# Patient Record
Sex: FEMALE | Race: WHITE | NOT HISPANIC OR LATINO | Employment: UNEMPLOYED | URBAN - METROPOLITAN AREA
[De-identification: names, ages, dates, MRNs, and addresses within clinical notes are randomized per-mention and may not be internally consistent; named-entity substitution may affect disease eponyms.]

---

## 2023-05-17 LAB
CREATININE (MG/DL) IN SER/PLAS: 1.21 MG/DL (ref 0.5–1.05)
GFR FEMALE: 49 ML/MIN/1.73M2

## 2023-06-04 LAB — URINE CULTURE: NO GROWTH

## 2023-08-30 PROBLEM — N20.0 NEPHROLITHIASIS: Status: ACTIVE | Noted: 2023-08-30

## 2023-08-30 PROBLEM — R33.9 INCOMPLETE BLADDER EMPTYING: Status: ACTIVE | Noted: 2023-08-30

## 2023-08-30 PROBLEM — L82.1 OTHER SEBORRHEIC KERATOSIS: Status: ACTIVE | Noted: 2023-08-10

## 2023-08-30 PROBLEM — N39.3 FEMALE STRESS INCONTINENCE: Status: ACTIVE | Noted: 2023-08-30

## 2023-08-30 PROBLEM — N81.9 FEMALE GENITAL PROLAPSE: Status: ACTIVE | Noted: 2023-08-30

## 2023-08-30 RX ORDER — CETIRIZINE HYDROCHLORIDE 10 MG/1
TABLET ORAL
COMMUNITY

## 2023-08-30 RX ORDER — BUTALBITAL, ACETAMINOPHEN, CAFFEINE AND CODEINE PHOSPHATE 50; 325; 40; 30 MG/1; MG/1; MG/1; MG/1
CAPSULE ORAL
COMMUNITY

## 2023-08-30 RX ORDER — VITAMIN B COMPLEX
CAPSULE ORAL
COMMUNITY

## 2023-08-30 RX ORDER — LEVOTHYROXINE SODIUM 88 UG/1
TABLET ORAL
COMMUNITY

## 2023-08-30 RX ORDER — AZELASTINE 1 MG/ML
SPRAY, METERED NASAL
COMMUNITY
Start: 2022-05-27

## 2023-08-30 RX ORDER — ESCITALOPRAM OXALATE 10 MG/1
TABLET ORAL
COMMUNITY

## 2023-10-06 ENCOUNTER — APPOINTMENT (OUTPATIENT)
Dept: UROLOGY | Facility: CLINIC | Age: 68
End: 2023-10-06
Payer: MEDICARE

## 2024-01-19 ENCOUNTER — APPOINTMENT (OUTPATIENT)
Dept: RADIOLOGY | Facility: CLINIC | Age: 69
End: 2024-01-19
Payer: MEDICARE

## 2024-01-22 ENCOUNTER — LAB (OUTPATIENT)
Dept: LAB | Facility: LAB | Age: 69
End: 2024-01-22
Payer: MEDICARE

## 2024-01-22 DIAGNOSIS — E78.2 MIXED HYPERLIPIDEMIA: ICD-10-CM

## 2024-01-22 DIAGNOSIS — R10.13 EPIGASTRIC PAIN: Primary | ICD-10-CM

## 2024-01-22 LAB
ALBUMIN SERPL BCP-MCNC: 4.2 G/DL (ref 3.4–5)
ALP SERPL-CCNC: 52 U/L (ref 33–136)
ALT SERPL W P-5'-P-CCNC: 11 U/L (ref 7–45)
AMYLASE SERPL-CCNC: 39 U/L (ref 29–103)
ANION GAP SERPL CALC-SCNC: 11 MMOL/L (ref 10–20)
AST SERPL W P-5'-P-CCNC: 12 U/L (ref 9–39)
BASOPHILS # BLD AUTO: 0.03 X10*3/UL (ref 0–0.1)
BASOPHILS NFR BLD AUTO: 0.5 %
BILIRUB SERPL-MCNC: 0.5 MG/DL (ref 0–1.2)
BUN SERPL-MCNC: 19 MG/DL (ref 6–23)
CALCIUM SERPL-MCNC: 9.6 MG/DL (ref 8.6–10.3)
CHLORIDE SERPL-SCNC: 104 MMOL/L (ref 98–107)
CHOLEST SERPL-MCNC: 292 MG/DL (ref 0–199)
CHOLESTEROL/HDL RATIO: 7.2
CO2 SERPL-SCNC: 34 MMOL/L (ref 21–32)
CREAT SERPL-MCNC: 1.14 MG/DL (ref 0.5–1.05)
EGFRCR SERPLBLD CKD-EPI 2021: 53 ML/MIN/1.73M*2
EOSINOPHIL # BLD AUTO: 0.17 X10*3/UL (ref 0–0.7)
EOSINOPHIL NFR BLD AUTO: 2.9 %
ERYTHROCYTE [DISTWIDTH] IN BLOOD BY AUTOMATED COUNT: 13 % (ref 11.5–14.5)
GLUCOSE SERPL-MCNC: 94 MG/DL (ref 74–99)
HCT VFR BLD AUTO: 46 % (ref 36–46)
HDLC SERPL-MCNC: 40.7 MG/DL
HGB BLD-MCNC: 14.5 G/DL (ref 12–16)
IMM GRANULOCYTES # BLD AUTO: 0.01 X10*3/UL (ref 0–0.7)
IMM GRANULOCYTES NFR BLD AUTO: 0.2 % (ref 0–0.9)
LDLC SERPL CALC-MCNC: 206 MG/DL
LIPASE SERPL-CCNC: 35 U/L (ref 9–82)
LYMPHOCYTES # BLD AUTO: 2.37 X10*3/UL (ref 1.2–4.8)
LYMPHOCYTES NFR BLD AUTO: 40.4 %
MCH RBC QN AUTO: 29.7 PG (ref 26–34)
MCHC RBC AUTO-ENTMCNC: 31.5 G/DL (ref 32–36)
MCV RBC AUTO: 94 FL (ref 80–100)
MONOCYTES # BLD AUTO: 0.39 X10*3/UL (ref 0.1–1)
MONOCYTES NFR BLD AUTO: 6.6 %
NEUTROPHILS # BLD AUTO: 2.9 X10*3/UL (ref 1.2–7.7)
NEUTROPHILS NFR BLD AUTO: 49.4 %
NON HDL CHOLESTEROL: 251 MG/DL (ref 0–149)
NRBC BLD-RTO: 0 /100 WBCS (ref 0–0)
PLATELET # BLD AUTO: 365 X10*3/UL (ref 150–450)
POTASSIUM SERPL-SCNC: 4.9 MMOL/L (ref 3.5–5.3)
PROT SERPL-MCNC: 6.4 G/DL (ref 6.4–8.2)
RBC # BLD AUTO: 4.88 X10*6/UL (ref 4–5.2)
SODIUM SERPL-SCNC: 144 MMOL/L (ref 136–145)
TRIGL SERPL-MCNC: 227 MG/DL (ref 0–149)
VLDL: 45 MG/DL (ref 0–40)
WBC # BLD AUTO: 5.9 X10*3/UL (ref 4.4–11.3)

## 2024-01-22 PROCEDURE — 36415 COLL VENOUS BLD VENIPUNCTURE: CPT

## 2024-01-22 PROCEDURE — 85025 COMPLETE CBC W/AUTO DIFF WBC: CPT

## 2024-01-22 PROCEDURE — 80061 LIPID PANEL: CPT

## 2024-01-22 PROCEDURE — 82150 ASSAY OF AMYLASE: CPT

## 2024-01-22 PROCEDURE — 80053 COMPREHEN METABOLIC PANEL: CPT

## 2024-01-22 PROCEDURE — 83690 ASSAY OF LIPASE: CPT

## 2024-01-23 ENCOUNTER — TELEPHONE (OUTPATIENT)
Dept: CARDIOLOGY | Facility: CLINIC | Age: 69
End: 2024-01-23
Payer: MEDICARE

## 2024-01-23 ENCOUNTER — APPOINTMENT (OUTPATIENT)
Dept: RADIOLOGY | Facility: CLINIC | Age: 69
End: 2024-01-23
Payer: MEDICARE

## 2024-01-23 NOTE — TELEPHONE ENCOUNTER
Patient had her lipids checked and they are high. She will like for you to take a lot at the results and advice her.

## 2024-01-26 ENCOUNTER — APPOINTMENT (OUTPATIENT)
Dept: RADIOLOGY | Facility: CLINIC | Age: 69
End: 2024-01-26
Payer: MEDICARE

## 2024-02-21 ENCOUNTER — APPOINTMENT (OUTPATIENT)
Dept: RADIOLOGY | Facility: CLINIC | Age: 69
End: 2024-02-21
Payer: MEDICARE

## 2024-03-07 ENCOUNTER — APPOINTMENT (OUTPATIENT)
Dept: RADIOLOGY | Facility: CLINIC | Age: 69
End: 2024-03-07
Payer: MEDICARE

## 2024-03-09 ENCOUNTER — APPOINTMENT (OUTPATIENT)
Dept: CARDIOLOGY | Facility: HOSPITAL | Age: 69
End: 2024-03-09
Payer: MEDICARE

## 2024-03-09 ENCOUNTER — APPOINTMENT (OUTPATIENT)
Dept: RADIOLOGY | Facility: HOSPITAL | Age: 69
End: 2024-03-09
Payer: MEDICARE

## 2024-03-09 ENCOUNTER — HOSPITAL ENCOUNTER (EMERGENCY)
Facility: HOSPITAL | Age: 69
Discharge: HOME | End: 2024-03-09
Attending: STUDENT IN AN ORGANIZED HEALTH CARE EDUCATION/TRAINING PROGRAM
Payer: MEDICARE

## 2024-03-09 VITALS
SYSTOLIC BLOOD PRESSURE: 146 MMHG | OXYGEN SATURATION: 98 % | HEART RATE: 70 BPM | RESPIRATION RATE: 18 BRPM | BODY MASS INDEX: 26.37 KG/M2 | WEIGHT: 168 LBS | DIASTOLIC BLOOD PRESSURE: 80 MMHG | HEIGHT: 67 IN | TEMPERATURE: 98.1 F

## 2024-03-09 DIAGNOSIS — J44.1 COPD EXACERBATION (MULTI): Primary | ICD-10-CM

## 2024-03-09 DIAGNOSIS — R07.89 OTHER CHEST PAIN: ICD-10-CM

## 2024-03-09 LAB
ALBUMIN SERPL BCP-MCNC: 4.6 G/DL (ref 3.4–5)
ALP SERPL-CCNC: 59 U/L (ref 33–136)
ALT SERPL W P-5'-P-CCNC: 14 U/L (ref 7–45)
ANION GAP SERPL CALC-SCNC: 12 MMOL/L (ref 10–20)
APPEARANCE UR: CLEAR
AST SERPL W P-5'-P-CCNC: 14 U/L (ref 9–39)
BASOPHILS # BLD AUTO: 0.04 X10*3/UL (ref 0–0.1)
BASOPHILS NFR BLD AUTO: 0.5 %
BILIRUB SERPL-MCNC: 0.5 MG/DL (ref 0–1.2)
BILIRUB UR STRIP.AUTO-MCNC: NEGATIVE MG/DL
BNP SERPL-MCNC: 24 PG/ML (ref 0–99)
BUN SERPL-MCNC: 21 MG/DL (ref 6–23)
CALCIUM SERPL-MCNC: 10.3 MG/DL (ref 8.6–10.3)
CARDIAC TROPONIN I PNL SERPL HS: 3 NG/L (ref 0–13)
CARDIAC TROPONIN I PNL SERPL HS: 3 NG/L (ref 0–13)
CHLORIDE SERPL-SCNC: 103 MMOL/L (ref 98–107)
CO2 SERPL-SCNC: 29 MMOL/L (ref 21–32)
COLOR UR: NORMAL
CREAT SERPL-MCNC: 1.02 MG/DL (ref 0.5–1.05)
D DIMER PPP FEU-MCNC: 530 NG/ML FEU
EGFRCR SERPLBLD CKD-EPI 2021: 61 ML/MIN/1.73M*2
EOSINOPHIL # BLD AUTO: 0.2 X10*3/UL (ref 0–0.7)
EOSINOPHIL NFR BLD AUTO: 2.7 %
ERYTHROCYTE [DISTWIDTH] IN BLOOD BY AUTOMATED COUNT: 12.6 % (ref 11.5–14.5)
GLUCOSE SERPL-MCNC: 100 MG/DL (ref 74–99)
GLUCOSE UR STRIP.AUTO-MCNC: NEGATIVE MG/DL
HCT VFR BLD AUTO: 46.7 % (ref 36–46)
HGB BLD-MCNC: 15.1 G/DL (ref 12–16)
IMM GRANULOCYTES # BLD AUTO: 0.02 X10*3/UL (ref 0–0.7)
IMM GRANULOCYTES NFR BLD AUTO: 0.3 % (ref 0–0.9)
INR PPP: 1 (ref 0.9–1.1)
KETONES UR STRIP.AUTO-MCNC: NEGATIVE MG/DL
LEUKOCYTE ESTERASE UR QL STRIP.AUTO: NEGATIVE
LIPASE SERPL-CCNC: 51 U/L (ref 9–82)
LYMPHOCYTES # BLD AUTO: 2.88 X10*3/UL (ref 1.2–4.8)
LYMPHOCYTES NFR BLD AUTO: 39.1 %
MCH RBC QN AUTO: 29.5 PG (ref 26–34)
MCHC RBC AUTO-ENTMCNC: 32.3 G/DL (ref 32–36)
MCV RBC AUTO: 91 FL (ref 80–100)
MONOCYTES # BLD AUTO: 0.51 X10*3/UL (ref 0.1–1)
MONOCYTES NFR BLD AUTO: 6.9 %
NEUTROPHILS # BLD AUTO: 3.72 X10*3/UL (ref 1.2–7.7)
NEUTROPHILS NFR BLD AUTO: 50.5 %
NITRITE UR QL STRIP.AUTO: NEGATIVE
NRBC BLD-RTO: 0 /100 WBCS (ref 0–0)
PH UR STRIP.AUTO: 5 [PH]
PLATELET # BLD AUTO: 376 X10*3/UL (ref 150–450)
POTASSIUM SERPL-SCNC: 4.3 MMOL/L (ref 3.5–5.3)
PROT SERPL-MCNC: 7.3 G/DL (ref 6.4–8.2)
PROT UR STRIP.AUTO-MCNC: NEGATIVE MG/DL
PROTHROMBIN TIME: 11.2 SECONDS (ref 9.8–12.8)
RBC # BLD AUTO: 5.11 X10*6/UL (ref 4–5.2)
RBC # UR STRIP.AUTO: NEGATIVE /UL
SODIUM SERPL-SCNC: 140 MMOL/L (ref 136–145)
SP GR UR STRIP.AUTO: 1.01
UROBILINOGEN UR STRIP.AUTO-MCNC: <2 MG/DL
WBC # BLD AUTO: 7.4 X10*3/UL (ref 4.4–11.3)

## 2024-03-09 PROCEDURE — 83880 ASSAY OF NATRIURETIC PEPTIDE: CPT | Performed by: STUDENT IN AN ORGANIZED HEALTH CARE EDUCATION/TRAINING PROGRAM

## 2024-03-09 PROCEDURE — 2500000004 HC RX 250 GENERAL PHARMACY W/ HCPCS (ALT 636 FOR OP/ED)

## 2024-03-09 PROCEDURE — 85610 PROTHROMBIN TIME: CPT | Performed by: STUDENT IN AN ORGANIZED HEALTH CARE EDUCATION/TRAINING PROGRAM

## 2024-03-09 PROCEDURE — 84484 ASSAY OF TROPONIN QUANT: CPT | Performed by: STUDENT IN AN ORGANIZED HEALTH CARE EDUCATION/TRAINING PROGRAM

## 2024-03-09 PROCEDURE — 96361 HYDRATE IV INFUSION ADD-ON: CPT

## 2024-03-09 PROCEDURE — 71045 X-RAY EXAM CHEST 1 VIEW: CPT | Performed by: RADIOLOGY

## 2024-03-09 PROCEDURE — 80053 COMPREHEN METABOLIC PANEL: CPT | Performed by: STUDENT IN AN ORGANIZED HEALTH CARE EDUCATION/TRAINING PROGRAM

## 2024-03-09 PROCEDURE — 2500000004 HC RX 250 GENERAL PHARMACY W/ HCPCS (ALT 636 FOR OP/ED): Performed by: STUDENT IN AN ORGANIZED HEALTH CARE EDUCATION/TRAINING PROGRAM

## 2024-03-09 PROCEDURE — 71045 X-RAY EXAM CHEST 1 VIEW: CPT

## 2024-03-09 PROCEDURE — 93005 ELECTROCARDIOGRAM TRACING: CPT

## 2024-03-09 PROCEDURE — 85025 COMPLETE CBC W/AUTO DIFF WBC: CPT | Performed by: STUDENT IN AN ORGANIZED HEALTH CARE EDUCATION/TRAINING PROGRAM

## 2024-03-09 PROCEDURE — 36415 COLL VENOUS BLD VENIPUNCTURE: CPT

## 2024-03-09 PROCEDURE — 99283 EMERGENCY DEPT VISIT LOW MDM: CPT | Mod: 25

## 2024-03-09 PROCEDURE — 84484 ASSAY OF TROPONIN QUANT: CPT

## 2024-03-09 PROCEDURE — 96360 HYDRATION IV INFUSION INIT: CPT

## 2024-03-09 PROCEDURE — 85379 FIBRIN DEGRADATION QUANT: CPT

## 2024-03-09 PROCEDURE — 83690 ASSAY OF LIPASE: CPT | Performed by: STUDENT IN AN ORGANIZED HEALTH CARE EDUCATION/TRAINING PROGRAM

## 2024-03-09 PROCEDURE — 36415 COLL VENOUS BLD VENIPUNCTURE: CPT | Performed by: STUDENT IN AN ORGANIZED HEALTH CARE EDUCATION/TRAINING PROGRAM

## 2024-03-09 PROCEDURE — 81003 URINALYSIS AUTO W/O SCOPE: CPT

## 2024-03-09 RX ORDER — BUDESONIDE AND FORMOTEROL FUMARATE DIHYDRATE 80; 4.5 UG/1; UG/1
2 AEROSOL RESPIRATORY (INHALATION)
Qty: 10.2 G | Refills: 0 | Status: SHIPPED | OUTPATIENT
Start: 2024-03-09 | End: 2025-03-09

## 2024-03-09 RX ADMIN — SODIUM CHLORIDE, POTASSIUM CHLORIDE, SODIUM LACTATE AND CALCIUM CHLORIDE 500 ML: 600; 310; 30; 20 INJECTION, SOLUTION INTRAVENOUS at 17:49

## 2024-03-09 RX ADMIN — SODIUM CHLORIDE, POTASSIUM CHLORIDE, SODIUM LACTATE AND CALCIUM CHLORIDE 500 ML: 600; 310; 30; 20 INJECTION, SOLUTION INTRAVENOUS at 18:41

## 2024-03-09 ASSESSMENT — PAIN - FUNCTIONAL ASSESSMENT: PAIN_FUNCTIONAL_ASSESSMENT: 0-10

## 2024-03-09 ASSESSMENT — COLUMBIA-SUICIDE SEVERITY RATING SCALE - C-SSRS
2. HAVE YOU ACTUALLY HAD ANY THOUGHTS OF KILLING YOURSELF?: NO
1. IN THE PAST MONTH, HAVE YOU WISHED YOU WERE DEAD OR WISHED YOU COULD GO TO SLEEP AND NOT WAKE UP?: NO
6. HAVE YOU EVER DONE ANYTHING, STARTED TO DO ANYTHING, OR PREPARED TO DO ANYTHING TO END YOUR LIFE?: NO

## 2024-03-09 ASSESSMENT — PAIN DESCRIPTION - LOCATION: LOCATION: CHEST

## 2024-03-09 ASSESSMENT — PAIN DESCRIPTION - PAIN TYPE: TYPE: ACUTE PAIN

## 2024-03-09 ASSESSMENT — LIFESTYLE VARIABLES
EVER FELT BAD OR GUILTY ABOUT YOUR DRINKING: NO
EVER HAD A DRINK FIRST THING IN THE MORNING TO STEADY YOUR NERVES TO GET RID OF A HANGOVER: NO
HAVE PEOPLE ANNOYED YOU BY CRITICIZING YOUR DRINKING: NO
HAVE YOU EVER FELT YOU SHOULD CUT DOWN ON YOUR DRINKING: NO

## 2024-03-09 ASSESSMENT — PAIN SCALES - GENERAL
PAINLEVEL_OUTOF10: 6
PAINLEVEL_OUTOF10: 4

## 2024-03-09 NOTE — ED PROVIDER NOTES
HPI   Chief Complaint   Patient presents with    Chest Pain       Patient is a 68-year-old female with past medical history of COPD, HLD, CKD 3 presenting to [facility] ED for worsening central sternal chest pain/upper abdominal pain that started yesterday night and has gotten worse.  Patient reports nausea, worsening dizziness, cough that started 2 days ago as well.  Patient does have significant smoking history of 20+ year pack per day smoking.  Patient has not smoked for 24 years.  Patient is not on any blood thinners.  Patient family medical history notable for MI in direct relatives.  Patient does not notice any change in the chest pain/shortness of breath with activity.  Chest pain is intermittent, and worsened with coughing.  Patient denies any vomiting, abdominal pain, fever, chills, headache, or weakness.                          Startex Coma Scale Score: 15                     Patient History   Past Medical History:   Diagnosis Date    COPD (chronic obstructive pulmonary disease) (CMS/HCC)      Past Surgical History:   Procedure Laterality Date    OTHER SURGICAL HISTORY  07/01/2021    Meniscus repair    OTHER SURGICAL HISTORY  07/01/2021    Gallbladder surgery     Family History   Problem Relation Name Age of Onset    Heart attack Mother      Heart attack Father      Heart attack Brother       Social History     Tobacco Use    Smoking status: Former     Types: Cigarettes    Smokeless tobacco: Never   Substance Use Topics    Alcohol use: Not Currently    Drug use: Never       Physical Exam   ED Triage Vitals [03/09/24 1546]   Temperature Heart Rate Respirations BP   36.7 °C (98.1 °F) 80 18 174/79      Pulse Ox Temp Source Heart Rate Source Patient Position   98 % Temporal Monitor Sitting      BP Location FiO2 (%)     Right arm --       Physical Exam  Constitutional:       Appearance: Normal appearance. She is normal weight.   HENT:      Head: Normocephalic and atraumatic.      Nose: Nose normal.       Mouth/Throat:      Mouth: Mucous membranes are moist.      Pharynx: Oropharynx is clear.   Eyes:      Extraocular Movements: Extraocular movements intact.      Conjunctiva/sclera: Conjunctivae normal.      Pupils: Pupils are equal, round, and reactive to light.   Cardiovascular:      Rate and Rhythm: Normal rate and regular rhythm.      Pulses: Normal pulses.      Heart sounds: Normal heart sounds.   Pulmonary:      Effort: Pulmonary effort is normal.      Breath sounds: Normal breath sounds. No decreased breath sounds, wheezing, rhonchi or rales.   Chest:      Chest wall: No deformity, tenderness or crepitus.   Abdominal:      General: Abdomen is flat. Bowel sounds are normal.      Palpations: Abdomen is soft.      Tenderness: There is abdominal tenderness.   Musculoskeletal:         General: Normal range of motion.      Cervical back: Normal range of motion and neck supple.   Skin:     General: Skin is warm and dry.      Capillary Refill: Capillary refill takes less than 2 seconds.   Neurological:      General: No focal deficit present.      Mental Status: She is alert and oriented to person, place, and time. Mental status is at baseline.   Psychiatric:         Mood and Affect: Mood normal.         Behavior: Behavior normal.         ED Course & MDM   Diagnoses as of 03/09/24 2010   COPD exacerbation (CMS/[facility])   Other chest pain       Medical Decision Making  Patient is a 68 y.o. female who presents to [facility] ED for Chest Pain. On initial ED evaluation, patient found to be in no acute distress. Per HPI, concern to evaluate and treat for possible COPD exacerbation versus costochondritis versus other acute cardiopulmonary process.  Also evaluating for component of positional versus orthostatic vertigo.  Obtaining CBC, CMP, EKG, troponin series, chest x-ray.  Given persistent dizziness, and shortness of breath, also obtaining D-dimer with concern for PE rule out.  Chest x-ray did show mild congestion.  Bolusing half a  liter LR IV fluids.  Patient D-dimer returned at 530, negative given age-adjusted cut off.  All remaining lab work reviewed, grossly unremarkable.  UA clear.  Patient given additional half liter LR bolus.  Plan to obtain CT imaging of the chest, however patient deferred at this time.  Patient reports significant improvement in chest pain.  Patient would like to obtain the CT during her pulmonology follow-up.  Patient be discharged on Symbicort inhaler.  Patient given strict return precautions regarding chest pain and shortness of breath.    Rx given for Symbicort. Patient to follow up with pulmonology outpatient. Anticipatory guidance and return precautions provided.  Patient otherwise stable for discharge.          Procedure  Procedures     Derek Guerrero MD  Resident  03/09/24 2013       Derek Guerrero MD  Resident  03/09/24 2013

## 2024-03-10 LAB — HOLD SPECIMEN: NORMAL

## 2024-03-10 NOTE — DISCHARGE INSTRUCTIONS
Please follow-up with your pulmonologist as planned, as well as obtain planned CT imaging.  Please return to closest ED if you develop any worsening chest pain, difficulty in breathing, dizziness, or weakness.  Please start taking Symbicort inhaler that was prescribed.

## 2024-03-13 ENCOUNTER — APPOINTMENT (OUTPATIENT)
Dept: RADIOLOGY | Facility: CLINIC | Age: 69
End: 2024-03-13
Payer: MEDICARE

## 2024-03-14 ENCOUNTER — HOSPITAL ENCOUNTER (OUTPATIENT)
Dept: RADIOLOGY | Facility: CLINIC | Age: 69
Discharge: HOME | End: 2024-03-14
Payer: MEDICARE

## 2024-03-14 DIAGNOSIS — R10.84 GENERALIZED ABDOMINAL PAIN: ICD-10-CM

## 2024-03-14 DIAGNOSIS — R91.8 OTHER NONSPECIFIC ABNORMAL FINDING OF LUNG FIELD: ICD-10-CM

## 2024-03-14 DIAGNOSIS — R10.13 EPIGASTRIC PAIN: Primary | ICD-10-CM

## 2024-03-14 PROCEDURE — 2550000001 HC RX 255 CONTRASTS: Performed by: NURSE PRACTITIONER

## 2024-03-14 PROCEDURE — 71250 CT THORAX DX C-: CPT | Performed by: RADIOLOGY

## 2024-03-14 PROCEDURE — 74177 CT ABD & PELVIS W/CONTRAST: CPT | Performed by: RADIOLOGY

## 2024-03-14 PROCEDURE — 71250 CT THORAX DX C-: CPT

## 2024-03-14 PROCEDURE — 74177 CT ABD & PELVIS W/CONTRAST: CPT

## 2024-03-14 RX ADMIN — IOHEXOL 75 ML: 350 INJECTION, SOLUTION INTRAVENOUS at 15:01

## 2024-03-19 LAB
ATRIAL RATE: 74 BPM
P AXIS: 77 DEGREES
P OFFSET: 209 MS
P ONSET: 153 MS
PR INTERVAL: 140 MS
Q ONSET: 223 MS
QRS COUNT: 13 BEATS
QRS DURATION: 80 MS
QT INTERVAL: 398 MS
QTC CALCULATION(BAZETT): 441 MS
QTC FREDERICIA: 427 MS
R AXIS: 89 DEGREES
T AXIS: 78 DEGREES
T OFFSET: 422 MS
VENTRICULAR RATE: 74 BPM

## 2024-03-19 NOTE — PROGRESS NOTES
Angeles Oneil MD   Adult Reconstruction and Joint Replacement Surgery  Phone: [phone]     Fax: [phone]     INITIAL CONSULTATION    Name: Tali Alvarez  : 1955  Date of Visit:  2024    CC: Left knee pain    Clinical History:  Tali Alvarez is a 68 y.o. female who presents with several years of LEFT > Right knee pain. They were referred by self.  She also endorses significant left groin and lateral hip pain.    Patient has tried the following Ice, Activity modification, Corticosteroid injections , and Xray. Date of last steroid injection: years ago. Patient does have pain at night. Patient is able to walk 2-3 blocks. Patient is currently using nothing as assistive device. Primarily complains of diffuse, anterior, lateral , and medial pain. Patient has difficulty with climbing stairs, descending stairs, walking , and walking on unlevel surfaces . The pain is significantly impacting her ability to perform activities of daily living. Patient reports no longer able to do activities such as walking without pain.     Notably, patient reports a lateral meniscus repair in the early  with Dr. Evans.  No other hip or knee surgery.  She avoids NSAIDs due to her kidney disease.    PROMs   None     Past Medical History:   Diagnosis Date    COPD (chronic obstructive pulmonary disease) (Multi)      Documented in chart and reviewed.     Past Surgical History:   Procedure Laterality Date    OTHER SURGICAL HISTORY  2021    Meniscus repair    OTHER SURGICAL HISTORY  2021    Gallbladder surgery       Allergies: She is allergic to amoxicillin-pot clavulanate, atorvastatin, duloxetine, and rosuvastatin.     Medications:  Current Outpatient Medications   Medication Instructions    azelastine (Astelin) 137 mcg (0.1 %) nasal spray nasal    b complex vitamins (Vitamins B Complex) capsule oral    budesonide-formoteroL (Symbicort) 80-4.5 mcg/actuation inhaler 2 puffs, inhalation, 2 times daily  RT, Rinse mouth with water after use to reduce aftertaste and incidence of candidiasis. Do not swallow.    butalbitaL-acetaminop-caf-cod (Fioricet W/Codeine) -95-30 mg capsule oral    cetirizine (ZyrTEC) 10 mg tablet oral    escitalopram (Lexapro) 10 mg tablet oral    levothyroxine (Synthroid) 88 mcg tablet oral       Family History   Problem Relation Name Age of Onset    Heart attack Mother      Heart attack Father      Heart attack Brother       Documented in chart and reviewed.     Social History     Tobacco Use    Smoking status: Former     Types: Cigarettes    Smokeless tobacco: Never   Substance Use Topics    Alcohol use: Not Currently         Review of Systems: Review of systems completed with medical assistant intake. Please refer to this note.     Falls: The patient denies any recent falls or fall-related injuries.    Physical Exam:  BMI: 26.3, which is abnormal. Encouraged to lose weight and to follow up with PCP.    Constitutional: The patient is well-appearing and well groomed.     Neurological/Psychiatric: The patient is alert and oriented to person, place and time. The patient has a normal mood and affect.    Skin Examination: The skin over the right lower extremity, left lower extremity, right upper extremity, and left upper extremity is intact without any evidence of infection or rash.    Cardiovascular Examination: There are no varicosities and the skin is normal temperature, capillary refill normal, arterial pulses normal, no edema.    Lymphatic Examination: There is no lymphatic swelling or palpable lymph nodes present around the involved joint.    Neurological Examination: Bilateral lower extremities are grossly neurologically intact. Sensation normal, motor function normal.    Gait: The patient ambulates with an antalgic gait.     Right Hip Examination:  The skin is intact over the hip.    There is no tenderness over the greater trochanter.    Range of motion is full extension to 100  degrees of flexion.    The hip is stable without subluxation or dislocation.    The hip internally rotates to 15 degrees and externally rotates to 45 degrees.    There is no pain with hip motion.    Left Hip Examination:  The skin is intact over the hip.    There is mild tenderness over the greater trochanter.    Range of motion is full extension to 90 degrees of flexion.    Positive Stinchfield test.    The hip is stable without subluxation or dislocation.    The hip internally rotates to 10 degrees and externally rotates to 35 degrees.    There is groin pain with hip motion.    Left Knee Examination:  Examination of the left knee reveals the skin to be intact.    There is a moderate effusion in the knee.    The alignment of the knee is Valgus.    This deformity is not correctable.    There is tenderness to palpation over the joint line.    There is significant quadriceps atrophy.    Range of Motion: 5 to 110 degrees of flexion.    The knee is stable to varus-valgus stress and anterior-posterior stress.     There is moderate grinding with range of motion.    There is moderate patellofemoral crepitus.    Right Knee Examination:  Examination of the right knee reveals the skin to be intact.     There is no obvious swelling.    There is a no effusion in the knee.     The alignment of the knee is normal.    There is no tenderness to palpation over the joint line.    There is no significant quadriceps atrophy.    Range of motion is full extension to 120 degrees of flexion.    The knee is stable to varus-valgus stress and anterior-posterior stress.     There is no grinding with range of motion.    There is no patellofemoral crepitus.    Prior Labs:   Lab Results   Component Value Date    WBC 7.4 03/09/2024    HGB 15.1 03/09/2024    HCT 46.7 (H) 03/09/2024    MCV 91 03/09/2024     03/09/2024      Lab Results   Component Value Date    INR 1.0 03/09/2024    PROTIME 11.2 03/09/2024         Lab Results   Component Value  "Date    GLUCOSE 100 (H) 03/09/2024    CALCIUM 10.3 03/09/2024     03/09/2024    K 4.3 03/09/2024    CO2 29 03/09/2024     03/09/2024    BUN 21 03/09/2024    CREATININE 1.02 03/09/2024      No results found for: \"CKTOTAL\", \"CKMB\", \"CKMBINDEX\", \"TROPONINI\"   Lab Results   Component Value Date    HGBA1C 5.3 07/17/2023         No results found for: \"CRP\"   No results found for: \"SEDRATE\"     Radiographs:  Radiographs were personally reviewed today. There is evidence of severe RIGHT  knee osteoarthritis with LATERAL  bone on bone apposition.  There is also evidence of severe LEFT knee osteoarthritis with near LATERAL bone-on-bone apposition.    There is also early moderate BILATERAL hip arthritis.    Impression:  68 y.o. female presents with severe BILATERAL knee osteoarthritis with bone on bone apposition.  There is also moderate bilateral hip arthritis.    Diagnosis:  Primary osteoarthritis of left knee    Left hip pain    Primary osteoarthritis of right knee    Primary osteoarthritis of left hip    Primary osteoarthritis of right hip    Recommendations / Plan:    I have discussed the options in detail with the patient. We have discussed anti-inflammatory medication, activity modification, physical therapy, corticosteroid injections, viscosupplementation injections, partial knee replacement surgery and total knee replacement surgery.  Also discussed similar interventions for her hip arthritis.  Patient has not yet exhausted all conservative treatment measures.  At this point, the left hip seems to be the most bothersome of her symptomatic joints.  I encouraged her to get a steroid injection in the left hip for diagnostic and therapeutic reasons and she has been scheduled with my colleague Dr. Coffey to do this.    The risks and benefits of all these treatment options have been discussed in detail. The patient has tried at least 3 months of the above conservative treatments and continues to have disabling " pain, impaired activities of daily living and worsened quality of life.  Encouraged her to participate in physical therapy.  A referral was provided today.  Discussed use of over-the-counter pain medications to manage her pain.  In her case it would involve Tylenol 1000 mg 3 times a day as she cannot take NSAIDs because of her renal issues.  Encouraged them to maintain range of motion and strength around the knee joints.  They will continue to implement these strategies in addressing their pain.       Recommend the patient continue optimizing nonsurgical treatment interventions as outlined above for management of their knee arthritis.  I would be happy to see them again at any point to discuss surgery if they are more optimized or to review progress of nonsurgical treatment of arthritis.  The patient verbalizes understanding with the recommendations and treatment plan as outlined above and is in agreement.  Questions were addressed.    _____________  Angeles Oneil MD   [facility]     Approximately 45 minutes were spent on the following tasks:              Preparing for the patient              Reviewing medical records              Taking a patient history              Performing a physical exam              Reviewing treatment options with the patient              Explaining the risks, potential benefits, and alternative to surgery  Explaining the expected rehabilitation after each treatment option  Explaining the potential long term expectations  Evaluating the diagnostic imaging     This office note was transcribed with dictation software.  Please excuse any typographical errors, program misunderstandings leading to inadvertent insertions or deletions of inappropriate wording, pronoun errors and other unintentional transcription errors not noticed on proof-reading.

## 2024-03-27 ENCOUNTER — OFFICE VISIT (OUTPATIENT)
Dept: DERMATOLOGY | Facility: CLINIC | Age: 69
End: 2024-03-27
Payer: MEDICARE

## 2024-03-27 DIAGNOSIS — L82.0 SEBORRHEIC KERATOSIS, INFLAMED: Primary | ICD-10-CM

## 2024-03-27 PROCEDURE — 99213 OFFICE O/P EST LOW 20 MIN: CPT | Performed by: NURSE PRACTITIONER

## 2024-03-27 PROCEDURE — 1159F MED LIST DOCD IN RCRD: CPT | Performed by: NURSE PRACTITIONER

## 2024-03-27 PROCEDURE — 1036F TOBACCO NON-USER: CPT | Performed by: NURSE PRACTITIONER

## 2024-03-27 NOTE — PROGRESS NOTES
Subjective     Tali Alvarez is a 68 y.o. female who presents for the following: multiple lesions.   Established patient in for lesions to face, chest and groin. Right temple previously treated with LN2 patient states that the lesion partially came off.      Review of Systems:  No other skin or systemic complaints other than what is documented elsewhere in the note.    The following portions of the chart were reviewed this encounter and updated as appropriate:       Skin Cancer History  No skin cancer on file.    Specialty Problems          Dermatology Problems    Other seborrheic keratosis     Past Medical History:  Tali Alvarez  has a past medical history of COPD (chronic obstructive pulmonary disease) (CMS/[facility]).    Past Surgical History:  Tali Alvarez  has a past surgical history that includes Other surgical history (07/01/2021) and Other surgical history (07/01/2021).    Family History:  Patient family history includes Heart attack in her brother, father, and mother.    Social History:  Tali Alvarez  reports that she has quit smoking. Her smoking use included cigarettes. She has never used smokeless tobacco. She reports that she does not currently use alcohol. She reports that she does not use drugs.    Allergies:  Amoxicillin-pot clavulanate, Atorvastatin, Duloxetine, and Rosuvastatin    Current Medications / CAM's:    Current Outpatient Medications:     azelastine (Astelin) 137 mcg (0.1 %) nasal spray, Administer into affected nostril(s)., Disp: , Rfl:     b complex vitamins (Vitamins B Complex) capsule, Take by mouth., Disp: , Rfl:     budesonide-formoteroL (Symbicort) 80-4.5 mcg/actuation inhaler, Inhale 2 puffs 2 times a day. Rinse mouth with water after use to reduce aftertaste and incidence of candidiasis. Do not swallow., Disp: 10.2 g, Rfl: 0    butalbitaL-acetaminop-caf-cod (Fioricet W/Codeine) -28-30 mg capsule, Take by mouth., Disp: , Rfl:     cetirizine (ZyrTEC) 10 mg tablet, Take by mouth.,  Disp: , Rfl:     escitalopram (Lexapro) 10 mg tablet, Take by mouth., Disp: , Rfl:     levothyroxine (Synthroid) 88 mcg tablet, Take by mouth., Disp: , Rfl:      Objective   Well appearing patient in no apparent distress; mood and affect are within normal limits.    A full examination was performed including scalp, head, eyes, ears, nose, lips, neck, chest, axillae, abdomen, back, buttocks, bilateral upper extremities, bilateral lower extremities, hands, feet, fingers, toes, fingernails, and toenails. All findings within normal limits unless otherwise noted below.    Assessment/Plan   1. Seborrheic keratosis, inflamed (8)  Head - Anterior (Face); Neck - Anterior; Left Thigh - Anterior; Chest - Medial (Center); Left Breast (2); Right Breast; Left Abdomen (side) - Upper    Plan:Counseling.  I counseled the patient regarding the following:  Skin Care: Seborrheic Keratoses are benign. No treatment is necessary, however given the amount of irritation on exam today treatment is initiated.  Expectations:Seborrheic Keratoses are benign warty growths. Patients get more of them as they age.    Destr of lesion - Chest - Medial (Center), Head - Anterior (Face), Left Abdomen (side) - Upper, Left Breast (2), Left Thigh - Anterior, Neck - Anterior, Right Breast  Complexity: simple    Destruction method: cryotherapy    Lesion destroyed using liquid nitrogen: Yes    Cryotherapy cycles:  2

## 2024-04-19 ENCOUNTER — HOSPITAL ENCOUNTER (OUTPATIENT)
Dept: RADIOLOGY | Facility: CLINIC | Age: 69
Discharge: HOME | End: 2024-04-19
Payer: MEDICARE

## 2024-04-19 ENCOUNTER — OFFICE VISIT (OUTPATIENT)
Dept: ORTHOPEDIC SURGERY | Facility: CLINIC | Age: 69
End: 2024-04-19
Payer: MEDICARE

## 2024-04-19 DIAGNOSIS — M25.552 LEFT HIP PAIN: ICD-10-CM

## 2024-04-19 DIAGNOSIS — M17.12 PRIMARY OSTEOARTHRITIS OF LEFT KNEE: Primary | ICD-10-CM

## 2024-04-19 DIAGNOSIS — M16.12 PRIMARY OSTEOARTHRITIS OF LEFT HIP: ICD-10-CM

## 2024-04-19 DIAGNOSIS — M17.12 PRIMARY OSTEOARTHRITIS OF LEFT KNEE: ICD-10-CM

## 2024-04-19 DIAGNOSIS — M17.11 PRIMARY OSTEOARTHRITIS OF RIGHT KNEE: ICD-10-CM

## 2024-04-19 DIAGNOSIS — M16.11 PRIMARY OSTEOARTHRITIS OF RIGHT HIP: ICD-10-CM

## 2024-04-19 PROCEDURE — 73564 X-RAY EXAM KNEE 4 OR MORE: CPT | Mod: LEFT SIDE | Performed by: RADIOLOGY

## 2024-04-19 PROCEDURE — 73502 X-RAY EXAM HIP UNI 2-3 VIEWS: CPT | Mod: LEFT SIDE | Performed by: RADIOLOGY

## 2024-04-19 PROCEDURE — 1125F AMNT PAIN NOTED PAIN PRSNT: CPT | Performed by: STUDENT IN AN ORGANIZED HEALTH CARE EDUCATION/TRAINING PROGRAM

## 2024-04-19 PROCEDURE — 73502 X-RAY EXAM HIP UNI 2-3 VIEWS: CPT | Mod: LT

## 2024-04-19 PROCEDURE — 99204 OFFICE O/P NEW MOD 45 MIN: CPT | Performed by: STUDENT IN AN ORGANIZED HEALTH CARE EDUCATION/TRAINING PROGRAM

## 2024-04-19 PROCEDURE — 73564 X-RAY EXAM KNEE 4 OR MORE: CPT | Mod: LT

## 2024-04-19 PROCEDURE — 1159F MED LIST DOCD IN RCRD: CPT | Performed by: STUDENT IN AN ORGANIZED HEALTH CARE EDUCATION/TRAINING PROGRAM

## 2024-04-19 PROCEDURE — 99214 OFFICE O/P EST MOD 30 MIN: CPT | Performed by: STUDENT IN AN ORGANIZED HEALTH CARE EDUCATION/TRAINING PROGRAM

## 2024-04-19 ASSESSMENT — PAIN SCALES - GENERAL: PAINLEVEL_OUTOF10: 6

## 2024-04-19 ASSESSMENT — PAIN DESCRIPTION - DESCRIPTORS: DESCRIPTORS: ACHING;THROBBING;SORE

## 2024-04-19 ASSESSMENT — PAIN - FUNCTIONAL ASSESSMENT: PAIN_FUNCTIONAL_ASSESSMENT: 0-10

## 2024-05-13 ENCOUNTER — OFFICE VISIT (OUTPATIENT)
Dept: ORTHOPEDIC SURGERY | Facility: CLINIC | Age: 69
End: 2024-05-13
Payer: MEDICARE

## 2024-05-13 DIAGNOSIS — M16.12 PRIMARY OSTEOARTHRITIS OF LEFT HIP: ICD-10-CM

## 2024-05-13 DIAGNOSIS — M17.0 PRIMARY OSTEOARTHRITIS OF BOTH KNEES: ICD-10-CM

## 2024-05-13 DIAGNOSIS — M76.892 HIP ABDUCTOR TENDONITIS, LEFT: Primary | ICD-10-CM

## 2024-05-13 DIAGNOSIS — M76.32 IT BAND SYNDROME, LEFT: ICD-10-CM

## 2024-05-13 PROCEDURE — 99214 OFFICE O/P EST MOD 30 MIN: CPT | Performed by: FAMILY MEDICINE

## 2024-05-13 PROCEDURE — 1159F MED LIST DOCD IN RCRD: CPT | Performed by: FAMILY MEDICINE

## 2024-05-13 NOTE — PROGRESS NOTES
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 Oneil.    Today, 5/13/2024, she presents for evaluation of left hip arthritis for possible cortisone injection upon referral by Dr. Oneil.  She was seen within the past month for bilateral hip and knee pain with subsequent DJD diagnosis from radiographs and exam findings.  She was treated conservatively at that time and referred to me for possible injection.  Today, 1 month later, she is not having as bad of pain at the left hip that she originally reported.  Her biggest complaint is right sciatica.  She was having posterior and anterior left hip and groin pain which is improved significantly.  She is scheduled to go to physical therapy later this week.  She cannot take NSAIDs due to kidney disease.  She has never had a local injection to the area.      She has no other complaints.    Physical Examination:     The LEFT hip and pelvis are without obvious signs of acute bony deformity, swelling, or instability. Active and passive range of motion are nearly full, symmetrical and pain-free.  Log roll is negative. Straight leg raise test is negative. Mary is negative. Crossover is mildly positive.  There is moderate tenderness in the gluteal fossa over the abductor muscles, myotendinous junction, and down the proximal half of the iliotibial band.  Hip strength is comparable to the opposite hip. The opposite hip is otherwise nontender and stable. Gait is slightly antalgic and tandem.    Imaging:  Radiographs of the left hips recently obtained were reviewed and revealed mild to moderate arthrosis.  No signs of acute fractures or dislocations.  The studies were reviewed by me personally in the office today.    === 04/19/24 ===  XR KNEE LEFT 4+ VIEWS  - Impression -  Moderate  osteoarthritis left knee, progressed from prior study.  Signed by: Oziel Schwartz 4/20/2024 8:42 AM    Problem List Items Addressed This Visit    None  Visit Diagnoses         Codes    Hip abductor tendonitis, left    -  Primary M76.892    Relevant Orders    Referral to Physical Therapy    Primary osteoarthritis of left hip     M16.12    Relevant Orders    Point of Care Ultrasound (Completed)    It band syndrome, left     M76.32    Relevant Orders    Referral to Physical Therapy    Primary osteoarthritis of both knees     M17.0            Assessment and Plan:     We reviewed the exam and x-ray findings and discussed the conservative and surgical treatment options. We agreed that her pain picture significantly improved since her visit 1 month ago.  She does not need a diagnostic intra-articular injection today but instead we will focus her therapy along the hip girdle and IT band.  She can use topical diclofenac and ice.  Activity modifications were reviewed.  We did review viscosupplementation options for her knee DJD and she can call my  to pursue insurance approval if she would like this treatment option.  She was given names of colleagues who could help her with her sciatica.    **This note was dictated using Dragon speech recognition software and was not corrected for spelling or grammatical errors**.    Sam Coffey MD  Sports Medicine Specialist  [facility] [place]

## 2024-05-13 NOTE — LETTER
May 13, 2024     Laya Henriquez DO  [address] [facility] [address]    Patient: Tali Alvarez   YOB: 1955   Date of Visit: 5/13/2024       Dear Dr. Laya Henriquez DO:    Thank you for referring Tali Alvarez to me for evaluation. Below are my notes for this consultation.  If you have questions, please do not hesitate to call me. I look forward to following your patient along with you.       Sincerely,     Sam Coffey MD      CC: Angeles Oneil MD  ______________________________________________________________________________________    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 Oneil.    Today, 5/13/2024, she presents for evaluation of left hip arthritis for possible cortisone injection upon referral by Dr. Oneil.  She was seen within the past month for bilateral hip and knee pain with subsequent DJD diagnosis from radiographs and exam findings.  She was treated conservatively at that time and referred to me for possible injection.  Today, 1 month later, she is not having as bad of pain at the left hip that she originally reported.  Her biggest complaint is right sciatica.  She was having posterior and anterior left hip and groin pain which is improved significantly.  She is scheduled to go to physical therapy later this week.  She cannot take NSAIDs due to kidney disease.  She has never had a local injection to the area.      She has no other complaints.    Physical Examination:     The LEFT hip and pelvis are without obvious signs of acute bony deformity, swelling, or instability. Active and passive range of motion are nearly full, symmetrical and pain-free.  Log roll is negative. Straight leg raise test is negative. Mary is negative. Crossover is mildly positive.   There is moderate tenderness in the gluteal fossa over the abductor muscles, myotendinous junction, and down the proximal half of the iliotibial band.  Hip strength is comparable to the opposite hip. The opposite hip is otherwise nontender and stable. Gait is slightly antalgic and tandem.    Imaging:  Radiographs of the left hips recently obtained were reviewed and revealed mild to moderate arthrosis.  No signs of acute fractures or dislocations.  The studies were reviewed by me personally in the office today.    === 04/19/24 ===  XR KNEE LEFT 4+ VIEWS  - Impression -  Moderate osteoarthritis left knee, progressed from prior study.  Signed by: Oziel Schwartz 4/20/2024 8:42 AM    Problem List Items Addressed This Visit    None  Visit Diagnoses         Codes    Hip abductor tendonitis, left    -  Primary M76.892    Relevant Orders    Referral to Physical Therapy    Primary osteoarthritis of left hip     M16.12    Relevant Orders    Point of Care Ultrasound (Completed)    It band syndrome, left     M76.32    Relevant Orders    Referral to Physical Therapy    Primary osteoarthritis of both knees     M17.0            Assessment and Plan:     We reviewed the exam and x-ray findings and discussed the conservative and surgical treatment options. We agreed that her pain picture significantly improved since her visit 1 month ago.  She does not need a diagnostic intra-articular injection today but instead we will focus her therapy along the hip girdle and IT band.  She can use topical diclofenac and ice.  Activity modifications were reviewed.  We did review viscosupplementation options for her knee DJD and she can call my  to pursue insurance approval if she would like this treatment option.  She was given names of colleagues who could help her with her sciatica.    **This note was dictated using Dragon speech recognition software and was not corrected for spelling or grammatical errors**.    Sam Coffey MD  Sports  Medicine Specialist  [facility] [employer]

## 2024-05-15 ENCOUNTER — APPOINTMENT (OUTPATIENT)
Dept: PHYSICAL THERAPY | Facility: CLINIC | Age: 69
End: 2024-05-15
Payer: MEDICARE

## 2024-05-30 ENCOUNTER — EVALUATION (OUTPATIENT)
Dept: PHYSICAL THERAPY | Facility: CLINIC | Age: 69
End: 2024-05-30
Payer: MEDICARE

## 2024-05-30 ENCOUNTER — CLINICAL SUPPORT (OUTPATIENT)
Dept: PHYSICAL THERAPY | Facility: CLINIC | Age: 69
End: 2024-05-30
Payer: MEDICARE

## 2024-05-30 DIAGNOSIS — M76.32 IT BAND SYNDROME, LEFT: ICD-10-CM

## 2024-05-30 DIAGNOSIS — M16.12 PRIMARY OSTEOARTHRITIS OF LEFT HIP: ICD-10-CM

## 2024-05-30 DIAGNOSIS — M17.12 PRIMARY OSTEOARTHRITIS OF LEFT KNEE: ICD-10-CM

## 2024-05-30 PROCEDURE — 97162 PT EVAL MOD COMPLEX 30 MIN: CPT | Mod: GP

## 2024-05-30 PROCEDURE — 97530 THERAPEUTIC ACTIVITIES: CPT | Mod: GP

## 2024-05-30 ASSESSMENT — PAIN DESCRIPTION - DESCRIPTORS: DESCRIPTORS: ACHING;BURNING;RADIATING

## 2024-05-30 ASSESSMENT — PAIN SCALES - GENERAL: PAINLEVEL_OUTOF10: 8

## 2024-05-30 ASSESSMENT — PAIN - FUNCTIONAL ASSESSMENT: PAIN_FUNCTIONAL_ASSESSMENT: 0-10

## 2024-05-30 NOTE — PROGRESS NOTES
Physical Therapy    Physical Therapy Evaluation and Treatment      Patient Name: Tali Alvarez  MRN: [id number]  Today's Date: 5/30/2024  Time Calculation  Start Time: 1600  Stop Time: 1643  Time Calculation (min): 43 min    Assessment:    Pt is a 69 y/o female who presents to outpatient physical therapy with reports of L hip and knee pain d/t degenerative changes from OA. The patient presents with the current impairments of Decreased strength, limited pain free mobility, Decreased endurance, and muscle tightness. These impairments currently limit their ability to perform ADLs, gardening, and stair negotiation. Due to the limitations listed above, the patient is at a decreased functional level compared to baseline. The patient would benefit from skilled physical therapy to improve strength, pain management, mobility, and endurance to facilitate a safe and efficient return to functional baseline. Patient's prognosis for improvement with therapy is Fair at this time.     Insurance:  [employer] Medicare Advantage  0% coinsurance  $35 co-pay  4200 OOP  $0 DED  Prior Auth required    Plan:  OP PT Plan  Treatment/Interventions: Aquatic therapy, Electrical stimulation, Gait training, Manual therapy, Neuromuscular re-education, Taping techniques, Therapeutic exercises, Therapeutic activities, Ultrasound  PT Plan: Skilled PT  PT Frequency: 2 times per week  Duration: 6 weeks  Onset Date: 04/20/24  Certification Period Start Date: 05/30/24  Certification Period End Date: 08/28/24  Rehab Potential: Fair  Plan of Care Agreement: Patient    Current Problem:   1. Primary osteoarthritis of left knee  Referral to Physical Therapy    Follow Up In Physical Therapy      2. Primary osteoarthritis of left hip  Referral to Physical Therapy    Follow Up In Physical Therapy          Subjective    Pt reporting pain level of 8/10 in the L hip and 7/10 in the L knee and an 8/10 in the R knee. Pt reporting significant difficulty with gardening. Pt  repots crawling up the steps in order to ascend the steps [id number] c/o pain. Pt reports significant difficulty getting in and out of the car due to increase in pain in th eL hip and knee. Pt requires increased time after standing from a seated position prior to ambulation due to pain with transition. Pt reports being woken up ever night due to pain in the hip and knee. Pt reports utilizing heating pad and ice (daily) and tylenol for pain relief.  General:  General  Reason for Referral: OA of the L knee and hip  Referred By: Angeles Oneil MD  Precautions:     Pain:  Pain Assessment  Pain Assessment: 0-10  Pain Score: 8  Pain Type: Chronic pain  Pain Location: Hip  Pain Orientation: Left  Pain Descriptors: Aching, Burning, Radiating  Pain Frequency: Constant/continuous  Home Living:   Stairs to the basement and second level. Pt reports significant difficulty with ascending and descending the stairs.  Prior Level of Function:  Prior Function Per Pt/Caregiver Report  Level of [place]: Independent with ADLs and functional transfers, Independent with homemaking with ambulation    Objective     General Assessments:  Pt with reports of pain of 10/10 with MMT  LE strength testing  Left  Hip:  Flexion: 4-/5  Abduction: 4-/5  Adduction: 4/5  IR: 4-/5  ER: 4-/5  Knee:  Flexion: 4-/5  Extension: 3+/5  Ankle:  Dorsiflexion: 5/5  Plantarflexion: 5/5    Right  Hip:  Flexion: 5/5  Abduction: 5/5  Adduction: 5/5  IR: 4-/5  ER: 4-/5  Knee:  Flexion: 4-/5  Extension: 4-/5  Ankle:  Dorsiflexion: 5/5  Plantarflexion: 5/5    FADDIR: positive for pain bilaterally (no restriction in ROM)  BIRDIE: Positive for pain bilaterally (no restriction in ROM)    Upon palpation, pt demonstrated trigger point areas over the greater trochanter, glut med, rectus femoris, and lateral hamstring bilaterally. Pt reporting reduction in symptoms with manual massage and ischemic compression.    Long arc traction perform for the BLE with reduction in  symptoms of the hip and knee but increased in LBP.    Pt ROM WNL B for the LE    Extremity/Trunk Assessments:  Pt with significant tightness of the lumbar spine and glut med bilaterallt    Outcome Measures:  LEFS (Hip): 29  LEFS (Knee): 44    Treatments:  There act  Education in HEP (performed 1 time each to ensure proper form)   - Supine Hamstring Stretch with Strap  - 2 x daily - 7 x weekly - 1 sets - 3 reps - 30 hold  - Supine Lower Trunk Rotation  - 2 x daily - 7 x weekly - 2 sets - 15 reps  - Supine Bridge  - 2 x daily - 7 x weekly - 2 sets - 10 reps  - Clamshell  - 2 x daily - 7 x weekly - 3 sets - 10 reps  EDUCATION:  Outpatient Education  Individual(s) Educated: Patient  Education Provided: Anatomy, Body Mechanics, Home Exercise Program  HEP provided:  - Supine Hamstring Stretch with Strap  - 2 x daily - 7 x weekly - 1 sets - 3 reps - 30 hold  - Supine Lower Trunk Rotation  - 2 x daily - 7 x weekly - 2 sets - 15 reps  - Supine Bridge  - 2 x daily - 7 x weekly - 2 sets - 10 reps  - Clamshell  - 2 x daily - 7 x weekly - 3 sets - 10 reps  Goals:  To be completed by end of POC  Pt will report and demonstrate independence in HEP  Pt will report a reduction of pain to 2/10 in order to improve activity tolerance and overall functional mobility   Pt will demonstrate improvement in strength to 4+/5 in the L hip and knee for improvements in patients ability to perform ADL tasks   Pt will demonstrate improvement in the  LEFS of > or = 9 points in order to demonstrate improvement in ADL function while also reporting a decrease in pain to <= 2/10  Pt will report and demonstrate improvement with ascending and descending 12 stairs with unilateral railing  w/o c/o pain  Pt will demonstrate reduction in trigger points in the glut med, IT band, and lateral hamstring for reduction in pain and improvement in ADL function  Pt will report ability to perform gardening tasks with independence

## 2024-07-22 ENCOUNTER — APPOINTMENT (OUTPATIENT)
Dept: ORTHOPEDIC SURGERY | Facility: CLINIC | Age: 69
End: 2024-07-22
Payer: MEDICARE

## 2024-08-12 ENCOUNTER — APPOINTMENT (OUTPATIENT)
Dept: ORTHOPEDIC SURGERY | Facility: CLINIC | Age: 69
End: 2024-08-12
Payer: MEDICARE

## 2024-08-30 ENCOUNTER — APPOINTMENT (OUTPATIENT)
Dept: UROLOGY | Facility: CLINIC | Age: 69
End: 2024-08-30
Payer: MEDICARE

## 2024-09-05 ENCOUNTER — APPOINTMENT (OUTPATIENT)
Dept: ORTHOPEDIC SURGERY | Facility: CLINIC | Age: 69
End: 2024-09-05
Payer: MEDICARE

## 2024-09-17 ENCOUNTER — APPOINTMENT (OUTPATIENT)
Dept: RADIOLOGY | Facility: HOSPITAL | Age: 69
End: 2024-09-17
Payer: MEDICARE

## 2024-09-17 ENCOUNTER — APPOINTMENT (OUTPATIENT)
Dept: CARDIOLOGY | Facility: HOSPITAL | Age: 69
End: 2024-09-17
Payer: MEDICARE

## 2024-09-17 ENCOUNTER — HOSPITAL ENCOUNTER (EMERGENCY)
Facility: HOSPITAL | Age: 69
Discharge: STILL ADMITTED OR ADMINISTRATIVE | End: 2024-09-17
Payer: MEDICARE

## 2024-09-17 ENCOUNTER — HOSPITAL ENCOUNTER (EMERGENCY)
Facility: HOSPITAL | Age: 69
Discharge: HOME | End: 2024-09-17
Attending: EMERGENCY MEDICINE
Payer: MEDICARE

## 2024-09-17 VITALS
DIASTOLIC BLOOD PRESSURE: 62 MMHG | OXYGEN SATURATION: 97 % | TEMPERATURE: 97.7 F | SYSTOLIC BLOOD PRESSURE: 136 MMHG | HEIGHT: 67 IN | RESPIRATION RATE: 20 BRPM | HEART RATE: 60 BPM | BODY MASS INDEX: 26.53 KG/M2 | WEIGHT: 169 LBS

## 2024-09-17 DIAGNOSIS — J44.1 COPD EXACERBATION (MULTI): ICD-10-CM

## 2024-09-17 DIAGNOSIS — R06.02 SHORTNESS OF BREATH: Primary | ICD-10-CM

## 2024-09-17 DIAGNOSIS — R06.02 SOB (SHORTNESS OF BREATH): ICD-10-CM

## 2024-09-17 LAB
ALBUMIN SERPL BCP-MCNC: 4.3 G/DL (ref 3.4–5)
ALP SERPL-CCNC: 55 U/L (ref 33–136)
ALT SERPL W P-5'-P-CCNC: 15 U/L (ref 7–45)
ANION GAP SERPL CALC-SCNC: 11 MMOL/L (ref 10–20)
AST SERPL W P-5'-P-CCNC: 13 U/L (ref 9–39)
ATRIAL RATE: 70 BPM
ATRIAL RATE: 73 BPM
BASOPHILS # BLD AUTO: 0.03 X10*3/UL (ref 0–0.1)
BASOPHILS NFR BLD AUTO: 0.5 %
BILIRUB SERPL-MCNC: 0.5 MG/DL (ref 0–1.2)
BNP SERPL-MCNC: 23 PG/ML (ref 0–99)
BUN SERPL-MCNC: 15 MG/DL (ref 6–23)
CALCIUM SERPL-MCNC: 9.6 MG/DL (ref 8.6–10.3)
CARDIAC TROPONIN I PNL SERPL HS: 3 NG/L (ref 0–13)
CARDIAC TROPONIN I PNL SERPL HS: 3 NG/L (ref 0–13)
CHLORIDE SERPL-SCNC: 104 MMOL/L (ref 98–107)
CO2 SERPL-SCNC: 31 MMOL/L (ref 21–32)
CREAT SERPL-MCNC: 1.03 MG/DL (ref 0.5–1.05)
EGFRCR SERPLBLD CKD-EPI 2021: 60 ML/MIN/1.73M*2
EOSINOPHIL # BLD AUTO: 0.15 X10*3/UL (ref 0–0.7)
EOSINOPHIL NFR BLD AUTO: 2.7 %
ERYTHROCYTE [DISTWIDTH] IN BLOOD BY AUTOMATED COUNT: 12.9 % (ref 11.5–14.5)
GLUCOSE SERPL-MCNC: 94 MG/DL (ref 74–99)
HCT VFR BLD AUTO: 44.4 % (ref 36–46)
HGB BLD-MCNC: 14.3 G/DL (ref 12–16)
IMM GRANULOCYTES # BLD AUTO: 0.02 X10*3/UL (ref 0–0.7)
IMM GRANULOCYTES NFR BLD AUTO: 0.4 % (ref 0–0.9)
INR PPP: 1 (ref 0.9–1.1)
LYMPHOCYTES # BLD AUTO: 1.77 X10*3/UL (ref 1.2–4.8)
LYMPHOCYTES NFR BLD AUTO: 31.8 %
MCH RBC QN AUTO: 29.7 PG (ref 26–34)
MCHC RBC AUTO-ENTMCNC: 32.2 G/DL (ref 32–36)
MCV RBC AUTO: 92 FL (ref 80–100)
MONOCYTES # BLD AUTO: 0.43 X10*3/UL (ref 0.1–1)
MONOCYTES NFR BLD AUTO: 7.7 %
NEUTROPHILS # BLD AUTO: 3.17 X10*3/UL (ref 1.2–7.7)
NEUTROPHILS NFR BLD AUTO: 56.9 %
NRBC BLD-RTO: 0 /100 WBCS (ref 0–0)
P AXIS: 63 DEGREES
P AXIS: 69 DEGREES
P OFFSET: 204 MS
P OFFSET: 207 MS
P ONSET: 144 MS
P ONSET: 148 MS
PLATELET # BLD AUTO: 329 X10*3/UL (ref 150–450)
POTASSIUM SERPL-SCNC: 4.2 MMOL/L (ref 3.5–5.3)
PR INTERVAL: 150 MS
PR INTERVAL: 158 MS
PROT SERPL-MCNC: 6.5 G/DL (ref 6.4–8.2)
PROTHROMBIN TIME: 11.2 SECONDS (ref 9.8–12.8)
Q ONSET: 223 MS
Q ONSET: 223 MS
QRS COUNT: 12 BEATS
QRS COUNT: 12 BEATS
QRS DURATION: 76 MS
QRS DURATION: 82 MS
QT INTERVAL: 390 MS
QT INTERVAL: 400 MS
QTC CALCULATION(BAZETT): 429 MS
QTC CALCULATION(BAZETT): 432 MS
QTC FREDERICIA: 416 MS
QTC FREDERICIA: 421 MS
R AXIS: 63 DEGREES
R AXIS: 65 DEGREES
RBC # BLD AUTO: 4.82 X10*6/UL (ref 4–5.2)
SODIUM SERPL-SCNC: 142 MMOL/L (ref 136–145)
T AXIS: 58 DEGREES
T AXIS: 62 DEGREES
T OFFSET: 418 MS
T OFFSET: 423 MS
VENTRICULAR RATE: 70 BPM
VENTRICULAR RATE: 73 BPM
WBC # BLD AUTO: 5.6 X10*3/UL (ref 4.4–11.3)

## 2024-09-17 PROCEDURE — 80053 COMPREHEN METABOLIC PANEL: CPT | Performed by: EMERGENCY MEDICINE

## 2024-09-17 PROCEDURE — 71045 X-RAY EXAM CHEST 1 VIEW: CPT

## 2024-09-17 PROCEDURE — 93005 ELECTROCARDIOGRAM TRACING: CPT

## 2024-09-17 PROCEDURE — 71275 CT ANGIOGRAPHY CHEST: CPT | Performed by: RADIOLOGY

## 2024-09-17 PROCEDURE — 2550000001 HC RX 255 CONTRASTS: Performed by: EMERGENCY MEDICINE

## 2024-09-17 PROCEDURE — 85610 PROTHROMBIN TIME: CPT | Performed by: EMERGENCY MEDICINE

## 2024-09-17 PROCEDURE — 74177 CT ABD & PELVIS W/CONTRAST: CPT

## 2024-09-17 PROCEDURE — 36415 COLL VENOUS BLD VENIPUNCTURE: CPT | Performed by: EMERGENCY MEDICINE

## 2024-09-17 PROCEDURE — 99281 EMR DPT VST MAYX REQ PHY/QHP: CPT | Mod: 25

## 2024-09-17 PROCEDURE — 96374 THER/PROPH/DIAG INJ IV PUSH: CPT

## 2024-09-17 PROCEDURE — 71275 CT ANGIOGRAPHY CHEST: CPT

## 2024-09-17 PROCEDURE — 84484 ASSAY OF TROPONIN QUANT: CPT | Performed by: EMERGENCY MEDICINE

## 2024-09-17 PROCEDURE — 83880 ASSAY OF NATRIURETIC PEPTIDE: CPT | Performed by: EMERGENCY MEDICINE

## 2024-09-17 PROCEDURE — 85025 COMPLETE CBC W/AUTO DIFF WBC: CPT | Performed by: EMERGENCY MEDICINE

## 2024-09-17 PROCEDURE — 2500000004 HC RX 250 GENERAL PHARMACY W/ HCPCS (ALT 636 FOR OP/ED): Performed by: EMERGENCY MEDICINE

## 2024-09-17 PROCEDURE — 4500999001 HC ED NO CHARGE

## 2024-09-17 PROCEDURE — 71045 X-RAY EXAM CHEST 1 VIEW: CPT | Performed by: RADIOLOGY

## 2024-09-17 PROCEDURE — 99285 EMERGENCY DEPT VISIT HI MDM: CPT | Mod: 25

## 2024-09-17 PROCEDURE — 74177 CT ABD & PELVIS W/CONTRAST: CPT | Performed by: RADIOLOGY

## 2024-09-17 RX ORDER — KETOROLAC TROMETHAMINE 15 MG/ML
15 INJECTION, SOLUTION INTRAMUSCULAR; INTRAVENOUS ONCE
Status: COMPLETED | OUTPATIENT
Start: 2024-09-17 | End: 2024-09-17

## 2024-09-17 ASSESSMENT — LIFESTYLE VARIABLES
HAVE PEOPLE ANNOYED YOU BY CRITICIZING YOUR DRINKING: NO
EVER FELT BAD OR GUILTY ABOUT YOUR DRINKING: NO
HAVE YOU EVER FELT YOU SHOULD CUT DOWN ON YOUR DRINKING: NO
TOTAL SCORE: 0
EVER HAD A DRINK FIRST THING IN THE MORNING TO STEADY YOUR NERVES TO GET RID OF A HANGOVER: NO

## 2024-09-17 ASSESSMENT — PAIN SCALES - GENERAL
PAINLEVEL_OUTOF10: 0 - NO PAIN
PAINLEVEL_OUTOF10: 7

## 2024-09-17 ASSESSMENT — PAIN DESCRIPTION - PAIN TYPE: TYPE: ACUTE PAIN

## 2024-09-17 ASSESSMENT — PAIN - FUNCTIONAL ASSESSMENT: PAIN_FUNCTIONAL_ASSESSMENT: 0-10

## 2024-09-17 NOTE — ED PROVIDER NOTES
EMERGENCY DEPARTMENT ENCOUNTER      Pt Name: Tali Alvarez  MRN: [id number]  Birthdate 1955  Date of evaluation: 9/17/2024  Provider: Cecil Mcnair DO    CHIEF COMPLAINT       Chief Complaint   Patient presents with    Shortness of Breath     +COPD. Had apt scheduled with pulmonology and reported came to ED instead. SOB  Started yesterday        HISTORY OF PRESENT ILLNESS    HPI  69-year-old female presents emergency department chief complaint of shortness of breath.  Patient indicates that she has COPD and was recently diagnosed with COVID-influenza.  She claims she has had cough pain and congestion since that time she has had persistent shortness of breath and new onset abdominal pain she came to the emergency department for evaluation she was concerned about the symptoms.  Nursing Notes were reviewed.    PAST MEDICAL HISTORY     Past Medical History:   Diagnosis Date    COPD (chronic obstructive pulmonary disease) (Multi)          SURGICAL HISTORY       Past Surgical History:   Procedure Laterality Date    OTHER SURGICAL HISTORY  07/01/2021    Meniscus repair    OTHER SURGICAL HISTORY  07/01/2021    Gallbladder surgery         CURRENT MEDICATIONS       Discharge Medication List as of 9/17/2024 12:45 PM        CONTINUE these medications which have NOT CHANGED    Details   azelastine (Astelin) 137 mcg (0.1 %) nasal spray Administer into affected nostril(s)., Starting Fri 5/27/2022, Historical Med      b complex vitamins (Vitamins B Complex) capsule Take by mouth., Historical Med      budesonide-formoteroL (Symbicort) 80-4.5 mcg/actuation inhaler Inhale 2 puffs 2 times a day. Rinse mouth with water after use to reduce aftertaste and incidence of candidiasis. Do not swallow., Starting Sat 3/9/2024, Until Sun 3/9/2025, Normal      butalbitaL-acetaminop-caf-cod (Fioricet W/Codeine) -17-30 mg capsule Take by mouth., Historical Med      cetirizine (ZyrTEC) 10 mg tablet Take by mouth., Historical Med       escitalopram (Lexapro) 10 mg tablet Take by mouth., Historical Med      levothyroxine (Synthroid) 88 mcg tablet Take by mouth., Historical Med             ALLERGIES     Amoxicillin-pot clavulanate, Atorvastatin, Duloxetine, and Rosuvastatin    FAMILY HISTORY       Family History   Problem Relation Name Age of Onset    Heart attack Mother      Heart attack Father      Heart attack Brother            SOCIAL HISTORY       Social History     Socioeconomic History    Marital status:    Tobacco Use    Smoking status: Former     Types: Cigarettes    Smokeless tobacco: Never   Substance and Sexual Activity    Alcohol use: Not Currently    Drug use: Never     Social Determinants of Health     Financial Resource Strain: Low Risk  (8/22/2024)    Received from [facility]    Overall Financial Resource Strain (CARDIA)     Difficulty of Paying Living Expenses: Not hard at all   Food Insecurity: No Food Insecurity (8/22/2024)    Received from [facility]    Hunger Vital Sign     Worried About Running Out of Food in the Last Year: Never true     Ran Out of Food in the Last Year: Never true   Transportation Needs: No Transportation Needs (8/22/2024)    Received from [facility]    PRAPARE - Transportation     Lack of Transportation (Medical): No     Lack of Transportation (Non-Medical): No   Physical Activity: Insufficiently Active (8/22/2024)    Received from [facility]    Exercise Vital Sign     Days of Exercise per Week: 1 day     Minutes of Exercise per Session: 30 min   Stress: Stress Concern Present (8/22/2024)    Received from [facility]    [place] [employer] - Occupational Stress Questionnaire     Feeling of Stress : Very much   Social Connections: Moderately Isolated (8/22/2024)    Received from [facility]    Social Connection and Isolation Panel [NHANES]     Frequency of Communication with Friends and Family: More than three times a week     Frequency of  Social Gatherings with Friends and Family: Three times a week     Attends Yazdanism Services: Never     Active Member of Clubs or Organizations: No     Attends Club or Organization Meetings: Patient declined     Marital Status:    Housing Stability: Unknown (1/8/2023)    [facility]    Housing Stability Vital Sign     Unable to Pay for Housing in the Last Year: No     Unstable Housing in the Last Year: No       SCREENINGS                        PHYSICAL EXAM    (up to 7 for level 4, 8 or more for level 5)     ED Triage Vitals [09/17/24 0901]   Temperature Heart Rate Respirations BP   36.5 °C (97.7 °F) 71 20 (!) 144/91      Pulse Ox Temp Source Heart Rate Source Patient Position   95 % Temporal Monitor Sitting      BP Location FiO2 (%)     Right arm --       Physical Exam  Constitutional:       Appearance: She is well-developed.   HENT:      Head: Normocephalic and atraumatic.      Right Ear: Tympanic membrane normal.      Left Ear: Tympanic membrane normal.      Nose: Nose normal.      Mouth/Throat:      Mouth: Mucous membranes are moist.      Pharynx: Oropharynx is clear.   Eyes:      Extraocular Movements: Extraocular movements intact.      Pupils: Pupils are equal, round, and reactive to light.   Cardiovascular:      Rate and Rhythm: Normal rate and regular rhythm.      Pulses: Normal pulses.      Heart sounds: Normal heart sounds.   Pulmonary:      Effort: Pulmonary effort is normal.      Breath sounds: Normal breath sounds.   Abdominal:      General: Abdomen is flat.      Palpations: Abdomen is soft.   Musculoskeletal:         General: Normal range of motion.      Cervical back: Normal range of motion and neck supple.   Skin:     General: Skin is warm.   Neurological:      General: No focal deficit present.      Mental Status: She is alert and oriented to person, place, and time.   Psychiatric:         Mood and Affect: Mood normal.         Behavior: Behavior normal.           DIAGNOSTIC RESULTS     LABS:  Labs Reviewed   COMPREHENSIVE METABOLIC PANEL - Abnormal       Result Value    Glucose 94      Sodium 142      Potassium 4.2      Chloride 104      Bicarbonate 31      Anion Gap 11      Urea Nitrogen 15      Creatinine 1.03      eGFR 60 (*)     Calcium 9.6      Albumin 4.3      Alkaline Phosphatase 55      Total Protein 6.5      AST 13      Bilirubin, Total 0.5      ALT 15     B-TYPE NATRIURETIC PEPTIDE - Normal    BNP 23      Narrative:        <100 pg/mL - Heart failure unlikely  100-299 pg/mL - Intermediate probability of acute heart                  failure exacerbation. Correlate with clinical                  context and patient history.    >=300 pg/mL - Heart Failure likely. Correlate with clinical                  context and patient history.    BNP testing is performed using different testing methodology at [facility] than at other [facility]. Direct result comparisons should only be made within the same method.      PROTIME-INR - Normal    Protime 11.2      INR 1.0     SERIAL TROPONIN-INITIAL - Normal    Troponin I, High Sensitivity 3      Narrative:     Less than 99th percentile of normal range cutoff-  Female and children under 18 years old <14 ng/L; Male <21 ng/L: Negative  Repeat testing should be performed if clinically indicated.     Female and children under 18 years old 14-50 ng/L; Male 21-50 ng/L:  Consistent with possible cardiac damage and possible increased clinical   risk. Serial measurements may help to assess extent of myocardial damage.     >50 ng/L: Consistent with cardiac damage, increased clinical risk and  myocardial infarction. Serial measurements may help assess extent of   myocardial damage.      NOTE: Children less than 1 year old may have higher baseline troponin   levels and results should be interpreted in conjunction with the overall   clinical context.     NOTE: Troponin I testing is performed using a different   testing  methodology at [facility] than at other   [facility]. Direct result comparisons should only   be made within the same method.   SERIAL TROPONIN, 1 HOUR - Normal    Troponin I, High Sensitivity 3      Narrative:     Less than 99th percentile of normal range cutoff-  Female and children under 18 years old <14 ng/L; Male <21 ng/L: Negative  Repeat testing should be performed if clinically indicated.     Female and children under 18 years old 14-50 ng/L; Male 21-50 ng/L:  Consistent with possible cardiac damage and possible increased clinical   risk. Serial measurements may help to assess extent of myocardial damage.     >50 ng/L: Consistent with cardiac damage, increased clinical risk and  myocardial infarction. Serial measurements may help assess extent of   myocardial damage.      NOTE: Children less than 1 year old may have higher baseline troponin   levels and results should be interpreted in conjunction with the overall   clinical context.     NOTE: Troponin I testing is performed using a different   testing methodology at [facility] than at [facility]. Direct result comparisons should only   be made within the same method.   CBC WITH AUTO DIFFERENTIAL    WBC 5.6      nRBC 0.0      RBC 4.82      Hemoglobin 14.3      Hematocrit 44.4      MCV 92      MCH 29.7      MCHC 32.2      RDW 12.9      Platelets 329      Neutrophils % 56.9      Immature Granulocytes %, Automated 0.4      Lymphocytes % 31.8      Monocytes % 7.7      Eosinophils % 2.7      Basophils % 0.5      Neutrophils Absolute 3.17      Immature Granulocytes Absolute, Automated 0.02      Lymphocytes Absolute 1.77      Monocytes Absolute 0.43      Eosinophils Absolute 0.15      Basophils Absolute 0.03     TROPONIN SERIES- (INITIAL, 1 HR)    Narrative:     The following orders were created for panel order Troponin I Series, High Sensitivity (0, 1 HR).  Procedure                               Abnormality          Status                     ---------                               -----------         ------                     Troponin I, High Sensiti...[[id number]]  Normal              Final result               Troponin, High Sensitivi...[[id number]]  Normal              Final result                 Please view results for these tests on the individual orders.       All other labs were within normal range or not returned as of this dictation.    Imaging  CT abdomen pelvis w IV contrast   Final Result   No acute intra-abdominal process.             Signed by: Zac Blanco 9/17/2024 12:06 PM   Dictation workstation:   [id number]      CT angio chest for pulmonary embolism   Final Result   1.  No pulmonary emboli or confluent airspace disease.             MACRO:   None        Signed by: Zac Blanco 9/17/2024 12:04 PM   Dictation workstation:   [id number]      XR chest 1 view   Final Result   No active cardiopulmonary disease                  MACRO:   None        Signed by: Dora Steinberg 9/17/2024 9:31 AM   Dictation workstation:   [id number]           Procedures  Procedures     EMERGENCY DEPARTMENT COURSE/MDM:   Medical Decision Making  69-year-old female presents emergency department chief complaint of shortness of breath.  Medical management treatment emergency department will be to rule out any possible blood clot and sepsis rule out.  Labs are grossly unremarkable.  Showing no acute concerning finding.  CT abdomen pelvis CT angio for pulmonary embolism chest x-ray showed no acute concerning findings no pulmonary embolism noted no intra-abdominal process noted.  Patient will be informed she most likely experiencing postviral syndrome from having COVID-19.  Patient be discharged home with strict return precautions.    Diagnoses as of 09/18/24 1554   Shortness of breath   COPD exacerbation (Multi)   SOB (shortness of breath)        Patient and or family in agreement and understanding of treatment plan.  All questions  answered.      I reviewed the case with the attending ED physician. The attending ED physician agrees with the plan. Patient and/or patient´s representative was counseled regarding labs, imaging, likely diagnosis, and plan. All questions were answered.    ED Medications administered this visit:    Medications   ketorolac (Toradol) injection 15 mg (15 mg intravenous Given 9/17/24 0931)   iohexol (OMNIPaque) 350 mg iodine/mL solution 75 mL (75 mL intravenous Given 9/17/24 1121)       New Prescriptions from this visit:    Discharge Medication List as of 9/17/2024 12:45 PM          Follow-up:  Laya Valdes DO [place]  [id number] [id number]  [phone]    In 3 days          Final Impression:   1. Shortness of breath    2. COPD exacerbation (Multi)    3. SOB (shortness of breath)          (Please note that portions of this note were completed with a voice recognition program.  Efforts were made to edit the dictations but occasionally words are mis-transcribed.)     Nghia Ferguson MD  Resident  09/17/24 1248    The patient was seen by the resident/fellow.  I have personally performed a substantive portion of the encounter.  I have seen and examined the patient; agree with the workup, evaluation, MDM, management and diagnosis.  The care plan has been discussed with the resident; I have reviewed the resident’s note and agree with the documented findings.      The patient is already on breathing treatments, and steroids without much improvement in her symptoms.  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  And will take some time to get better.  There is no sign of blood clot today on the CT scanning.  She to follow-up with the pulmonologist as directed and return to the emergency room for any worsening concerning symptoms otherwise.  No new medications were prescribed for this visit.                               Cecil Mcnair, DO  09/18/24 7940

## 2024-09-17 NOTE — DISCHARGE INSTRUCTIONS
Please follow with your pulmonologist as directed and return to the emergency room for any worsening concerning symptoms otherwise.

## 2024-09-18 RX ORDER — IPRATROPIUM BROMIDE AND ALBUTEROL SULFATE 2.5; .5 MG/3ML; MG/3ML
3 SOLUTION RESPIRATORY (INHALATION)
Qty: 120 ML | Refills: 0 | Status: SHIPPED | OUTPATIENT
Start: 2024-09-18 | End: 2024-09-18

## 2024-09-18 RX ORDER — PREDNISONE 20 MG/1
40 TABLET ORAL DAILY
Qty: 10 TABLET | Refills: 0 | Status: SHIPPED | OUTPATIENT
Start: 2024-09-18 | End: 2024-09-18

## 2024-10-10 ENCOUNTER — PREP FOR PROCEDURE (OUTPATIENT)
Dept: UROLOGY | Facility: HOSPITAL | Age: 69
End: 2024-10-10

## 2024-10-10 ENCOUNTER — APPOINTMENT (OUTPATIENT)
Dept: UROLOGY | Facility: CLINIC | Age: 69
End: 2024-10-10
Payer: MEDICARE

## 2024-10-10 VITALS
WEIGHT: 165.4 LBS | DIASTOLIC BLOOD PRESSURE: 83 MMHG | SYSTOLIC BLOOD PRESSURE: 175 MMHG | TEMPERATURE: 98.5 F | BODY MASS INDEX: 25.91 KG/M2 | HEART RATE: 72 BPM

## 2024-10-10 DIAGNOSIS — R39.89 OTHER SYMPTOMS AND SIGNS INVOLVING THE GENITOURINARY SYSTEM: ICD-10-CM

## 2024-10-10 DIAGNOSIS — N81.4 CYSTOCELE WITH UTERINE PROLAPSE: Primary | ICD-10-CM

## 2024-10-10 DIAGNOSIS — N39.3 SUI (STRESS URINARY INCONTINENCE, FEMALE): ICD-10-CM

## 2024-10-10 DIAGNOSIS — N81.9 FEMALE GENITAL PROLAPSE, UNSPECIFIED TYPE: ICD-10-CM

## 2024-10-10 PROCEDURE — 1159F MED LIST DOCD IN RCRD: CPT | Performed by: UROLOGY

## 2024-10-10 PROCEDURE — G2211 COMPLEX E/M VISIT ADD ON: HCPCS | Performed by: UROLOGY

## 2024-10-10 PROCEDURE — 99214 OFFICE O/P EST MOD 30 MIN: CPT | Performed by: UROLOGY

## 2024-10-10 RX ORDER — ACETAMINOPHEN 325 MG/1
975 TABLET ORAL ONCE
OUTPATIENT
Start: 2024-10-10 | End: 2024-10-10

## 2024-10-10 RX ORDER — PHENAZOPYRIDINE HYDROCHLORIDE 100 MG/1
200 TABLET, FILM COATED ORAL ONCE
OUTPATIENT
Start: 2024-10-10 | End: 2024-10-10

## 2024-10-10 RX ORDER — TIOTROPIUM BROMIDE 18 UG/1
1 CAPSULE ORAL; RESPIRATORY (INHALATION)
COMMUNITY

## 2024-10-10 RX ORDER — ARIPIPRAZOLE 2 MG/1
2 TABLET ORAL DAILY
COMMUNITY

## 2024-10-10 RX ORDER — EZETIMIBE 10 MG/1
10 TABLET ORAL DAILY
COMMUNITY

## 2024-10-10 NOTE — PROGRESS NOTES
"HISTORY OF PRESENT ILLNESS:  This is a 69 y.o. female who presents for follow-up for recurrent UTI. Patient was last seen by Bina Rivera MD MPH on [6/2/23]    Incomplete bladder emptying  Recurrent UTI  Elevated serum creatinine  Hx of pelvic organ prolapse  Failed pessary management  POP-Q [9/1/22]: Aa: +2. Ba: +2 C: -4 Gh: 4. Pb: 4 TVL: 8 Ap: 0. Bp: 0. D: -6.   Occult RODOLFO on UDS  Hx of RODOLFO  Nephrolithiasis  Pelvic pain  Chronic constipation    We previously discussed a vaginal hysterectomy with uterosacral ligament suspension, perineoplasty, and mid-urethral sling at her last visit with me on [10/13/22]. Bina Rivera's note on [6/2/23] notes that the patient \"has some concessions regarding proceeding with a female surgeon\".    Patient did not continue with her surgery due to her insurance not approving it, her  had a coronary stent placed, etc.  is recovering well.     Patient reports having to splint sometimes after lifting weight but not after urination. This also gives her a sensation of a bulge. She has urinary incontinence when she laughs/coughs.   She has her uterus.     Patient reports dyspareunia, constipation. She takes a stool soften [Dulcalax]. No postmenopausal bleeding. She had an abnormal PAP smear result but it has since been resolved.     Patient requested we check to be sure she is not currently experiencing a yeast infection.    DTFx: every couple of hours  NTFx: 1x      Below are a transcription of her previous UDS result dictation from [9/15/22]:    Review of urodynamic testing revealed on uroflow the patient voided 12 cc with a peak flow of 2.2 cc/sec and an average flow of 1.9 cc/sec. Patient left 400 cc residual. On CMG, the patient had the first desire to void at 180 cc and strong desire at 408 cc. There was no evidence of detrusor overactivity. There was evidence of stress urinary incontinence with reduction of prolapse at bladder volume of 150 cc, LPP at 46 cm of H2O. " Patient had a bladder capacity of 481 cc. On pressure flow study, the patient was given permission to void and voided 167 cc with a peak flow of 5.2 cc/sec. There was Valsalva voiding behavior. Detrusor pressure at maximum flow at 32 cm of H2O. Patient left 313 cc residual. Results are consistent with incomplete bladder emptying, occult RODOLFO, and Valsalva voiding behavior.           PHYSICAL EXAMINATION:  No LMP recorded. Patient is postmenopausal.  Body mass index is 25.91 kg/m².  Visit Vitals  /83   Pulse 72   Temp 36.9 °C (98.5 °F) (Temporal)   Wt 75 kg (165 lb 6.4 oz)   BMI 25.91 kg/m²   OB Status Postmenopausal   Smoking Status Former   BSA 1.88 m²     General Appearance: well appearing  Neuro: Alert and oriented     Pelvic:  Genitourinary: normal external genitalia, Bartholin's glands negative, [place]'s glands negative  Urethra: normal meatus, non-tender, no periurethral mass, hypermobility of urethra  Vaginal mucosa: normal  Cervix: normal  Uterus: normal size, nontender  Adnexae: negative nontender, no masses    POP-Q (in supine position):       Aa: +2     Ba: +2     C: -1              Gh: 4    Pb: 3     TVL: 8              Ap: -1.5     Bp: x     D: -2    No evidence of yeast infection detected during exam  Cough did not produce leakage during exam  Bladder is empty upon bimanual exam    Rectal: visibly concentric squeeze, no rectocele detected    No urine collected. Patient unable to void.     IMPRESSION AND PLAN:  Tali Alvarez is a 69 y.o. who presents with uterovaginal prolapse and stress incontinence.   Patient was counseled regarding the options for management.  She has indicated her interest in proceeding with vaginal hysterectomy with uterosacral suspension and mid urethral sling as we discussed previously.  Patient understands the risk and benefit of the procedure.  She is ready to schedule the procedure.  Patient will call to schedule her hysterectomy after discussing with her .      Follow up two weeks after surgery.     Scribe Attestation  By signing my name below, ITeodora Scribe   attest that this documentation has been prepared under the direction and in the presence of Ashvin Madsen MD.

## 2024-10-18 ENCOUNTER — TELEPHONE (OUTPATIENT)
Dept: UROLOGY | Facility: CLINIC | Age: 69
End: 2024-10-18
Payer: MEDICARE

## 2024-10-18 NOTE — TELEPHONE ENCOUNTER
Attempted to contact patient regarding her surgery needing scheduled with Dr. Madsen. MICKEYM for patient to return call at her soonest convenience.

## 2024-10-29 ENCOUNTER — HOSPITAL ENCOUNTER (OUTPATIENT)
Facility: HOSPITAL | Age: 69
Setting detail: OUTPATIENT SURGERY
End: 2024-10-29
Attending: UROLOGY | Admitting: UROLOGY
Payer: MEDICARE

## 2024-10-29 DIAGNOSIS — R39.89 SENSATION OF PRESSURE IN BLADDER AREA: ICD-10-CM

## 2024-10-29 PROBLEM — N81.4 CYSTOCELE WITH UTERINE PROLAPSE: Status: ACTIVE | Noted: 2024-10-10

## 2024-10-29 PROBLEM — N39.3 SUI (STRESS URINARY INCONTINENCE, FEMALE): Status: ACTIVE | Noted: 2024-10-10

## 2024-11-01 ENCOUNTER — APPOINTMENT (OUTPATIENT)
Dept: UROLOGY | Facility: CLINIC | Age: 69
End: 2024-11-01
Payer: MEDICARE

## 2024-11-08 ENCOUNTER — LAB (OUTPATIENT)
Dept: LAB | Facility: LAB | Age: 69
End: 2024-11-08
Payer: MEDICARE

## 2024-11-08 DIAGNOSIS — E78.5 HYPERLIPIDEMIA, UNSPECIFIED: ICD-10-CM

## 2024-11-08 DIAGNOSIS — R39.89 SENSATION OF PRESSURE IN BLADDER AREA: ICD-10-CM

## 2024-11-08 DIAGNOSIS — Z13.1 ENCOUNTER FOR SCREENING FOR DIABETES MELLITUS: ICD-10-CM

## 2024-11-08 DIAGNOSIS — Z13.220 ENCOUNTER FOR SCREENING FOR LIPOID DISORDERS: Primary | ICD-10-CM

## 2024-11-08 LAB
APPEARANCE UR: CLEAR
BILIRUB UR STRIP.AUTO-MCNC: NEGATIVE MG/DL
CHOLEST SERPL-MCNC: 196 MG/DL (ref 0–199)
CHOLESTEROL/HDL RATIO: 4.5
COLOR UR: NORMAL
GLUCOSE UR STRIP.AUTO-MCNC: NORMAL MG/DL
HDLC SERPL-MCNC: 43.4 MG/DL
HOLD SPECIMEN: NORMAL
KETONES UR STRIP.AUTO-MCNC: NEGATIVE MG/DL
LDLC SERPL CALC-MCNC: 129 MG/DL
LEUKOCYTE ESTERASE UR QL STRIP.AUTO: NEGATIVE
NITRITE UR QL STRIP.AUTO: NEGATIVE
NON HDL CHOLESTEROL: 153 MG/DL (ref 0–149)
PH UR STRIP.AUTO: 5.5 [PH]
PROT UR STRIP.AUTO-MCNC: NEGATIVE MG/DL
RBC # UR STRIP.AUTO: NEGATIVE /UL
SP GR UR STRIP.AUTO: 1.01
TRIGL SERPL-MCNC: 120 MG/DL (ref 0–149)
UROBILINOGEN UR STRIP.AUTO-MCNC: NORMAL MG/DL
VLDL: 24 MG/DL (ref 0–40)

## 2024-11-08 PROCEDURE — 80061 LIPID PANEL: CPT

## 2024-11-08 PROCEDURE — 36415 COLL VENOUS BLD VENIPUNCTURE: CPT

## 2024-11-08 PROCEDURE — 81003 URINALYSIS AUTO W/O SCOPE: CPT

## 2024-11-11 ENCOUNTER — HOSPITAL ENCOUNTER (OUTPATIENT)
Dept: RADIOLOGY | Facility: EXTERNAL LOCATION | Age: 69
Discharge: HOME | End: 2024-11-11

## 2024-11-11 ENCOUNTER — APPOINTMENT (OUTPATIENT)
Dept: RADIOLOGY | Facility: CLINIC | Age: 69
End: 2024-11-11
Payer: MEDICARE

## 2024-11-11 ENCOUNTER — OFFICE VISIT (OUTPATIENT)
Dept: ORTHOPEDIC SURGERY | Facility: CLINIC | Age: 69
End: 2024-11-11
Payer: MEDICARE

## 2024-11-11 DIAGNOSIS — M17.0 PRIMARY OSTEOARTHRITIS OF BOTH KNEES: Primary | ICD-10-CM

## 2024-11-11 PROCEDURE — 99214 OFFICE O/P EST MOD 30 MIN: CPT | Performed by: FAMILY MEDICINE

## 2024-11-11 PROCEDURE — 20611 DRAIN/INJ JOINT/BURSA W/US: CPT | Mod: 50 | Performed by: FAMILY MEDICINE

## 2024-11-11 PROCEDURE — 2500000004 HC RX 250 GENERAL PHARMACY W/ HCPCS (ALT 636 FOR OP/ED): Mod: JZ | Performed by: FAMILY MEDICINE

## 2024-11-11 NOTE — PROGRESS NOTES
Patient ID: Tali Alvarez is a 69 y.o. female.    L Inj/Asp: bilateral knee on 11/11/2024 4:26 PM  Indications: pain  Details: 22 G needle, ultrasound-guided superolateral approach  Medications (Right): 60 mg sodium hyaluronate 60 mg/3 mL  Medications (Left): 60 mg sodium hyaluronate 60 mg/3 mL  Outcome: tolerated well, no immediate complications  Procedure, treatment alternatives, risks and benefits explained, specific risks discussed. Consent was given by the patient. Immediately prior to procedure a time out was called to verify the correct patient, procedure, equipment, support staff and site/side marked as required. Patient was prepped and draped in the usual sterile fashion.       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 Oneil.    5/13/2024, she presents for evaluation of left hip arthritis for possible cortisone injection upon referral by Dr. Oneil.  She was seen within the past month for bilateral hip and knee pain with subsequent DJD diagnosis from radiographs and exam findings.  She was treated conservatively at that time and referred to me for possible injection.  Today, 1 month later, she is not having as bad of pain at the left hip that she originally reported.  Her biggest complaint is right sciatica.  She was having posterior and anterior left hip and groin pain which is improved significantly.  She is scheduled to go to physical therapy later this week.  She cannot take NSAIDs due to kidney disease.  She has never had a local injection to the area.    We agreed that her pain picture significantly improved since her visit 1 month ago.  She does not need a diagnostic intra-articular injection today but instead we will focus her therapy along the hip girdle and IT band.  She can use topical diclofenac  and ice.  Activity modifications were reviewed.  WE DID REVIEW VISCOSUPPLEMENTATION OPTIONS FOR HER KNEE DJD AND SHE CAN CALL MY  TO PURSUE INSURANCE approval if she would like this treatment option.  She was given names of colleagues who could help her with her sciatica.    Today, 11/11/2024, she returns with her  for follow-up of chronic bilateral knee pain and to start a trial of Durling viscosupplementation.  She has never had gel injections before.  She is requesting long-term analgesia for chronic pain.  She denies interval injury or trauma.    She has no other complaints.    Physical Examination:     The RIGHT knee has trace  joint effusion. Patella crepitus and grind are positive. There is minimal tenderness to the medial and lateral joint lines. Flexion and extension are without mechanical blocking. There is no instability with stress testing.   The LEFT knee is without joint effusion. Patella crepitus and grind are positive. There is minimal tenderness to the medial and lateral joint lines. Flexion and extension are without mechanical blocking. There is no instability with stress testing.   Skin - no rashes, sores, or open lesions. Strength, sensory and vascular exams are otherwise normal. There is no clubbing, cyanosis or edema.  Gait is slightly antalgic and tandem.    Imaging:  === 04/19/24 ===  XR KNEE LEFT 4+ VIEWS  - Impression -  Moderate osteoarthritis left knee, progressed from prior study.  Signed by: Oziel Schwartz 4/20/2024 8:42 AM      Procedure Note:    Procedure #1: After consent was obtained, the RIGHT knee was prepped in a sterile fashion. Ultrasound guidance was used to help insure proper needle placement into the knee joint, decrease patient discomfort, and decrease collateral damage. The joint was visualized and Durolane 60 Mg was injected without any complications. Ultrasound images were saved on an internal file for later reference. The patient tolerated the procedure  well and the area was cleaned and bandaged.    Procedure #2: After consent was obtained, the LEFT knee was prepped in a sterile fashion. Ultrasound guidance was used to help insure proper needle placement into the knee joint, decrease patient discomfort, and decrease collateral damage. The joint was visualized and Durolane 60 Mg was injected without any complications. Ultrasound images were saved on an internal file for later reference. The patient tolerated the procedure well and the area was cleaned and bandaged.    The patient will follow-up in 1 week    Problem List Items Addressed This Visit    None  Visit Diagnoses         Codes    Primary osteoarthritis of both knees    -  Primary M17.0    Relevant Orders    Point of Care Ultrasound (Completed)    L Inj/Asp: bilateral knee            Assessment and Plan:     We reviewed the exam and x-ray findings and discussed the conservative and surgical treatment options. We agreed upon a trial of Durling viscosupplementation to both knees.  She tolerated this well.  Activity modifications were reviewed.  She will continue her current conservative treatment plan.  She will follow-up in 8 weeks or sooner for recheck.    **This note was dictated using Dragon speech recognition software and was not corrected for spelling or grammatical errors**.    Sam Coffey MD  Sports Medicine Specialist  [facility] [place]

## 2024-11-14 ENCOUNTER — HOSPITAL ENCOUNTER (OUTPATIENT)
Dept: RADIOLOGY | Facility: CLINIC | Age: 69
Discharge: HOME | End: 2024-11-14
Payer: MEDICARE

## 2024-11-14 DIAGNOSIS — R07.9 CHEST PAIN, UNSPECIFIED: ICD-10-CM

## 2024-11-14 PROCEDURE — 75571 CT HRT W/O DYE W/CA TEST: CPT

## 2024-11-26 ENCOUNTER — APPOINTMENT (OUTPATIENT)
Dept: PREADMISSION TESTING | Facility: HOSPITAL | Age: 69
End: 2024-11-26
Payer: MEDICARE

## 2024-12-02 ENCOUNTER — APPOINTMENT (OUTPATIENT)
Dept: RADIOLOGY | Facility: HOSPITAL | Age: 69
End: 2024-12-02
Payer: MEDICARE

## 2024-12-23 ENCOUNTER — APPOINTMENT (OUTPATIENT)
Dept: UROLOGY | Facility: CLINIC | Age: 69
End: 2024-12-23
Payer: MEDICARE

## 2025-01-03 ENCOUNTER — APPOINTMENT (OUTPATIENT)
Dept: CARDIOLOGY | Facility: CLINIC | Age: 70
End: 2025-01-03
Payer: MEDICARE

## 2025-01-13 ENCOUNTER — APPOINTMENT (OUTPATIENT)
Dept: ORTHOPEDIC SURGERY | Facility: CLINIC | Age: 70
End: 2025-01-13
Payer: MEDICARE

## 2025-01-28 ENCOUNTER — APPOINTMENT (OUTPATIENT)
Dept: CARDIOLOGY | Facility: CLINIC | Age: 70
End: 2025-01-28
Payer: MEDICARE

## 2025-01-30 ENCOUNTER — PREP FOR PROCEDURE (OUTPATIENT)
Dept: UROLOGY | Facility: HOSPITAL | Age: 70
End: 2025-01-30
Payer: MEDICARE

## 2025-01-30 ENCOUNTER — HOSPITAL ENCOUNTER (OUTPATIENT)
Facility: HOSPITAL | Age: 70
Setting detail: OUTPATIENT SURGERY
End: 2025-01-30
Attending: UROLOGY | Admitting: UROLOGY
Payer: MEDICARE

## 2025-01-30 ENCOUNTER — APPOINTMENT (OUTPATIENT)
Dept: ORTHOPEDIC SURGERY | Facility: CLINIC | Age: 70
End: 2025-01-30
Payer: MEDICARE

## 2025-01-30 DIAGNOSIS — N81.2 UTEROVAGINAL PROLAPSE, INCOMPLETE: Primary | ICD-10-CM

## 2025-01-30 DIAGNOSIS — N39.3 SUI (STRESS URINARY INCONTINENCE, FEMALE): ICD-10-CM

## 2025-02-13 ENCOUNTER — HOSPITAL ENCOUNTER (OUTPATIENT)
Dept: RADIOLOGY | Facility: EXTERNAL LOCATION | Age: 70
Discharge: HOME | End: 2025-02-13

## 2025-02-13 ENCOUNTER — OFFICE VISIT (OUTPATIENT)
Dept: ORTHOPEDIC SURGERY | Facility: CLINIC | Age: 70
End: 2025-02-13
Payer: MEDICARE

## 2025-02-13 ENCOUNTER — HOSPITAL ENCOUNTER (OUTPATIENT)
Dept: RADIOLOGY | Facility: CLINIC | Age: 70
Discharge: HOME | End: 2025-02-13
Payer: MEDICARE

## 2025-02-13 DIAGNOSIS — M17.12 PRIMARY OSTEOARTHRITIS OF LEFT KNEE: ICD-10-CM

## 2025-02-13 DIAGNOSIS — M17.11 PRIMARY OSTEOARTHRITIS OF RIGHT KNEE: Primary | ICD-10-CM

## 2025-02-13 DIAGNOSIS — M17.11 PRIMARY OSTEOARTHRITIS OF RIGHT KNEE: ICD-10-CM

## 2025-02-13 DIAGNOSIS — M76.31 ILIOTIBIAL BAND TENDONITIS, RIGHT: ICD-10-CM

## 2025-02-13 DIAGNOSIS — M76.32 IT BAND SYNDROME, LEFT: ICD-10-CM

## 2025-02-13 PROCEDURE — 99214 OFFICE O/P EST MOD 30 MIN: CPT | Mod: 25 | Performed by: FAMILY MEDICINE

## 2025-02-13 PROCEDURE — 2500000004 HC RX 250 GENERAL PHARMACY W/ HCPCS (ALT 636 FOR OP/ED): Performed by: FAMILY MEDICINE

## 2025-02-13 PROCEDURE — 99214 OFFICE O/P EST MOD 30 MIN: CPT | Performed by: FAMILY MEDICINE

## 2025-02-13 PROCEDURE — 20611 DRAIN/INJ JOINT/BURSA W/US: CPT | Mod: RT | Performed by: FAMILY MEDICINE

## 2025-02-13 PROCEDURE — 73564 X-RAY EXAM KNEE 4 OR MORE: CPT | Mod: RT

## 2025-02-13 RX ORDER — ROPIVACAINE HYDROCHLORIDE 5 MG/ML
4 INJECTION, SOLUTION EPIDURAL; INFILTRATION; PERINEURAL
Status: COMPLETED | OUTPATIENT
Start: 2025-02-13 | End: 2025-02-13

## 2025-02-13 RX ORDER — METHYLPREDNISOLONE ACETATE 40 MG/ML
80 INJECTION, SUSPENSION INTRA-ARTICULAR; INTRALESIONAL; INTRAMUSCULAR; SOFT TISSUE
Status: COMPLETED | OUTPATIENT
Start: 2025-02-13 | End: 2025-02-13

## 2025-02-13 RX ORDER — LIDOCAINE HYDROCHLORIDE 10 MG/ML
4 INJECTION, SOLUTION INFILTRATION; PERINEURAL
Status: COMPLETED | OUTPATIENT
Start: 2025-02-13 | End: 2025-02-13

## 2025-02-13 RX ADMIN — ROPIVACAINE HYDROCHLORIDE 4 ML: 5 INJECTION EPIDURAL; INFILTRATION; PERINEURAL at 16:08

## 2025-02-13 RX ADMIN — METHYLPREDNISOLONE ACETATE 80 MG: 40 INJECTION, SUSPENSION INTRA-ARTICULAR; INTRALESIONAL; INTRAMUSCULAR; INTRASYNOVIAL; SOFT TISSUE at 16:08

## 2025-02-13 RX ADMIN — LIDOCAINE HYDROCHLORIDE 4 ML: 10 INJECTION, SOLUTION INFILTRATION; PERINEURAL at 16:08

## 2025-02-13 NOTE — PROGRESS NOTES
Patient ID: Tali Alvarez is a 69 y.o. female.    L Inj/Asp: R knee on 2/13/2025 4:08 PM  Indications: pain  Details: 22 G needle, ultrasound-guided superolateral approach  Medications: 4 mL lidocaine 10 mg/mL (1 %); 80 mg methylPREDNISolone acetate 40 mg/mL; 4 mL ropivacaine 0.5 % (5 mg/mL)  Outcome: tolerated well, no immediate complications  Procedure, treatment alternatives, risks and benefits explained, specific risks discussed. Consent was given by the patient. Immediately prior to procedure a time out was called to verify the correct patient, procedure, equipment, support staff and site/side marked as required. Patient was prepped and draped in the usual sterile fashion.       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    5/13/2024, she presents for evaluation of left hip arthritis for possible cortisone injection upon referral by Dr. Oneil.  She was seen within the past month for bilateral hip and knee pain with subsequent DJD diagnosis from radiographs and exam findings.  She was treated conservatively at that time and referred to me for possible injection.  Today, 1 month later, she is not having as bad of pain at the left hip that she originally reported.  Her biggest complaint is right sciatica.  She was having posterior and anterior left hip and groin pain which is improved significantly.  She is scheduled to go to physical therapy later this week.  She cannot take NSAIDs due to kidney disease.  She has never had a local injection to the area.    We agreed that her pain picture significantly improved since her visit 1 month ago.  She does not need a diagnostic intra-articular injection today but instead we will focus her therapy along the hip girdle and IT band.  She can use topical diclofenac and ice.  Activity  modifications were reviewed.  WE DID REVIEW VISCOSUPPLEMENTATION OPTIONS FOR HER KNEE DJD AND SHE CAN CALL MY  TO PURSUE INSURANCE approval if she would like this treatment option.  She was given names of colleagues who could help her with her sciatica.    11/11/2024, she returns with her  for follow-up of chronic bilateral knee pain and to start a trial of Durolane viscosupplementation.  She has never had gel injections before.  She is requesting long-term analgesia for chronic pain.  She denies interval injury or trauma.  We agreed upon a trial of Durolane viscosupplementation to both knees.  She tolerated this well.  Activity modifications were reviewed.  She will continue her current conservative treatment plan.  She will follow-up in 8 weeks or sooner for recheck.    Today, 02/13/2025, she returns for a 9-week follow-up of chronic bilateral knee pain after a trial of Durolane viscosupplementation.  She reports good relief in the left knee but no improvement at all in the right knee.  The right knee has been very painful.  She denies interval injury or trauma.  All of her pain is subpatellar and worse with knee flexion activities.  She is also having lateral pain in both knees.    She has no other complaints.    Physical Examination:   Bilateral thighs are tender down the distal half of the IT band.  LFC compression test is positive to both sides.    The RIGHT knee has trace joint effusion. Patella crepitus and grind are positive. There is minimal tenderness to the medial and lateral joint lines. Flexion and extension are without mechanical blocking. There is no instability with stress testing.   The LEFT knee is without tenderness or instability.  There is good pain-free range of motion.  Skin - no rashes, sores, or open lesions. Strength, sensory and vascular exams are otherwise normal. There is no clubbing, cyanosis or edema.  Gait is slightly antalgic and tandem.    Imaging:  === 04/19/24  ===  XR KNEE LEFT 4+ VIEWS  - Impression -  Moderate osteoarthritis left knee, progressed from prior study.  Signed by: Oziel Schwartz 4/20/2024 8:42 AM      Procedure Note:  After consent was obtained, the right knee was prepped in a sterile fashion. Ultrasound guidance was used to help insure proper needle placement into the joint, decrease patient discomfort, and decrease collateral damage. The knee joint was visualized and Depo-Medrol 80 mg with lidocaine 4 mL & ropivacaine 4 mL were injected without any complications. Ultrasound images were saved on an internal file for later reference. The patient tolerated the procedure well and the area was cleaned and bandaged.      Procedure Note Attestation   Procedure performed by my sports medicine fellow, Dr. Jamil Marie.  I, Dr. Sam Coffey MD, supervised the entire procedure .      Assessment and Plan:     1. Primary osteoarthritis of right knee  XR knee right 4+ views    Point of Care Ultrasound    L Inj/Asp: R knee      2. Iliotibial band tendonitis, right  Referral to Physical Therapy      3. It band syndrome, left  Referral to Physical Therapy      4. Primary osteoarthritis of left knee          We reviewed the exam and x-ray findings and discussed the conservative and surgical treatment options.  We agreed that she had good response at the left knee with HA/Durolane but not the right.  We agreed to repeat a cortisone injection at her right knee which she tolerated well.  Activity modifications were reviewed.  She also has IT band tendinitis down both legs.  She will use topical diclofenac and was referred to formal physical therapy.  She will follow-up in 8 weeks for recheck.    **This note was dictated using Dragon speech recognition software and was not corrected for spelling or grammatical errors**.    Sam Coffey MD  Sports Medicine Specialist  [facility] [place]

## 2025-02-21 ENCOUNTER — APPOINTMENT (OUTPATIENT)
Dept: CARDIOLOGY | Facility: CLINIC | Age: 70
End: 2025-02-21
Payer: MEDICARE

## 2025-02-21 VITALS
HEIGHT: 67 IN | HEART RATE: 73 BPM | WEIGHT: 169 LBS | SYSTOLIC BLOOD PRESSURE: 134 MMHG | DIASTOLIC BLOOD PRESSURE: 74 MMHG | OXYGEN SATURATION: 98 % | BODY MASS INDEX: 26.53 KG/M2 | RESPIRATION RATE: 18 BRPM

## 2025-02-21 DIAGNOSIS — E78.2 MIXED HYPERLIPIDEMIA: ICD-10-CM

## 2025-02-21 DIAGNOSIS — I25.10 CORONARY ARTERY CALCIFICATION: ICD-10-CM

## 2025-02-21 DIAGNOSIS — R07.89 ATYPICAL CHEST PAIN: Primary | ICD-10-CM

## 2025-02-21 DIAGNOSIS — R00.2 PALPITATIONS: ICD-10-CM

## 2025-02-21 PROCEDURE — 1160F RVW MEDS BY RX/DR IN RCRD: CPT | Performed by: NURSE PRACTITIONER

## 2025-02-21 PROCEDURE — 93000 ELECTROCARDIOGRAM COMPLETE: CPT | Performed by: NURSE PRACTITIONER

## 2025-02-21 PROCEDURE — 99214 OFFICE O/P EST MOD 30 MIN: CPT | Performed by: NURSE PRACTITIONER

## 2025-02-21 PROCEDURE — 1159F MED LIST DOCD IN RCRD: CPT | Performed by: NURSE PRACTITIONER

## 2025-02-21 PROCEDURE — 3008F BODY MASS INDEX DOCD: CPT | Performed by: NURSE PRACTITIONER

## 2025-02-21 NOTE — PROGRESS NOTES
Name : Tali Alvarez   : 1955   MRN : [id number]   ENC Date : 2025    CC: Elevated CAC, Hyperlipidemia, and Palpitations     HPI:    Tali Alvarez is a 69 y.o. female with PMHx sig for coronary artery calcification, hyperlipidemia, Palpitations, superficial RLE thrombus on DOAC, CKD stage 3 & Hypothyroidism on TRT who presents today as above.    Here today to follow up on cardiovascular diagnostics done in the fall & ordered by PCP. Reports high anxiety & panic attacks. Has been having chest pain when eating, but subsides after a few minutes. No exertional chest pain.    CAC has increased some from 2019, but this is not diagnostic. Lexiscan was done & normal.    CV Diagnostics:  CAC score 24:  LM 0  LAD 83  LCx 8  RCA 55  Total 146      Nuclear Stress Test 10/16/24:    1. SPECT Perfusion Study: Normal.    2. There is no scintigraphic evidence for inducible ischemia.    3. No evidence of scarred myocardium.    4. Left ventricle is small. The left ventricle systolic function is   hyperdynamic.    5. Right ventricle is normal in size. The right ventricle systolic   function is normal.    6. This is a low risk scan.     Echo 23: EF 55-60%, no RWMA, impaired relaxation, mild TR.    Event monitor done 23-23. No sustained arrhythmias. PACs/PVCs rare. 14 recorded patient events, all correlated with Sinus Rhythm.    CT cardiac score test 2019: Total 74, RCA with 60 & LAD with 14      ROS: unless otherwise noted in the history of present illness, all other systems were reviewed and they are negative for complaints     Allergies:  Doxycycline, Amoxicillin-pot clavulanate, Atorvastatin, Buspirone, Ciprofloxacin, Duloxetine, Sulfa (sulfonamide antibiotics), Azithromycin, and Rosuvastatin    Current Outpatient Medications   Medication Instructions    apixaban (ELIQUIS) 5 mg, 2 times daily    ARIPiprazole (ABILIFY) 2 mg, Daily    azelastine (Astelin) 137 mcg (0.1 %) nasal spray Administer into  affected nostril(s).    b complex vitamins (Vitamins B Complex) capsule Take by mouth.    budesonide-formoteroL (Symbicort) 80-4.5 mcg/actuation inhaler 2 puffs, inhalation, 2 times daily RT, Rinse mouth with water after use to reduce aftertaste and incidence of candidiasis. Do not swallow.    cetirizine (ZyrTEC) 10 mg tablet Take by mouth.    escitalopram (Lexapro) 10 mg tablet Take by mouth.    ezetimibe (ZETIA) 10 mg, Daily    levothyroxine (Synthroid) 88 mcg tablet Take by mouth.    tiotropium (Spiriva) 18 mcg inhalation capsule 1 capsule, Daily RT        Last Labs:  CBC  Lab Results   Component Value Date    WBC 5.6 09/17/2024    HGB 14.3 09/17/2024    HCT 44.4 09/17/2024    MCV 92 09/17/2024     09/17/2024       CMP  Lab Results   Component Value Date    CALCIUM 9.6 09/17/2024    PROT 6.5 09/17/2024    ALBUMIN 4.3 09/17/2024    AST 13 09/17/2024    ALT 15 09/17/2024    ALKPHOS 55 09/17/2024    BILITOT 0.5 09/17/2024       BMP   Lab Results   Component Value Date     09/17/2024    K 4.2 09/17/2024     09/17/2024    CO2 31 09/17/2024    GLUCOSE 94 09/17/2024    BUN 15 09/17/2024    CREATININE 1.03 09/17/2024       LIPID PANEL   Lab Results   Component Value Date    CHOL 196 11/08/2024    TRIG 120 11/08/2024    HDL 43.4 11/08/2024    CHHDL 4.5 11/08/2024    VLDL 24 11/08/2024    NHDL 153 (H) 11/08/2024       RENAL FUNCTION PANEL   Lab Results   Component Value Date    GLUCOSE 94 09/17/2024     09/17/2024    K 4.2 09/17/2024     09/17/2024    CO2 31 09/17/2024    ANIONGAP 11 09/17/2024    BUN 15 09/17/2024    CREATININE 1.03 09/17/2024    CALCIUM 9.6 09/17/2024    ALBUMIN 4.3 09/17/2024        Lab Results   Component Value Date    BNP 23 09/17/2024    HGBA1C 5.3 07/17/2023     I have reviewed the above labs & diagnostics    Last Recorded Vitals:  Vitals:    02/21/25 1604   BP: 134/74   BP Location: Left arm   Patient Position: Sitting   Pulse: 73   Resp: 18   SpO2: 98%   Weight: 76.7  "kg (169 lb)   Height: 1.702 m (5' 7\")     Physical Exam:  On exam Ms. Tali Alvarez appears her stated age, is alert and oriented x3, and in no acute distress. Her sclera are anicteric and her oropharynx has moist mucous membranes. Her neck is supple and without thyromegaly. The JVP is ~5 cm of water above the right atrium. Her cardiac exam has regular rhythm, normal S1, S2. No S3/4. There are no murmurs. Her lungs are clear to auscultation bilaterally and there is no dullness to percussion. Her abdomen is soft, nontender with normoactive bowel sounds. There is no HJR. The extremities are warm and without edema. The skin is dry. There is no rash present. The distal pulses are 2-3+ in all four extremities. Her mood and affect are appropriate for todays encounter.     Assessment/Plan:  Chest pain. On review of symptoms & diagnostics, unlikely to be of cardiac etiology. As it occurs with eating advise she follow up with GI    Coronary artery calcification. Recent lexiscan was normal. C/w aggressive risk factor modifications.    Hyperlipidemia. Intolerant of multiple statins. Currently on monotherapy with Zetia. LDL remains > 100. Advise initiation of PCSK9i (Praluent as this is covered by her insurance). Ce would like to repeat her lipid panel first & if LDL still elevated then open to starting PCSK9i.  Will have follow up conversation with her once lab results    Palpitations. Has been under control     Follow up with Dr. Michael in 6 month to establish collaborative care or sooner if needed    Tracy M Schwab, APRN-CNP  "

## 2025-02-22 PROBLEM — R07.89 ATYPICAL CHEST PAIN: Status: ACTIVE | Noted: 2025-02-22

## 2025-02-22 PROBLEM — R00.2 PALPITATIONS: Status: ACTIVE | Noted: 2025-02-22

## 2025-02-22 PROBLEM — I25.10 CORONARY ARTERY CALCIFICATION: Status: ACTIVE | Noted: 2025-02-22

## 2025-04-02 ENCOUNTER — APPOINTMENT (OUTPATIENT)
Dept: DERMATOLOGY | Facility: CLINIC | Age: 70
End: 2025-04-02
Payer: MEDICARE

## 2025-04-02 DIAGNOSIS — L82.0 SEBORRHEIC KERATOSIS, INFLAMED: ICD-10-CM

## 2025-04-02 DIAGNOSIS — L91.8 SKIN TAG: ICD-10-CM

## 2025-04-02 DIAGNOSIS — D48.5 NEOPLASM OF UNCERTAIN BEHAVIOR OF SKIN: Primary | ICD-10-CM

## 2025-04-02 PROCEDURE — 1036F TOBACCO NON-USER: CPT | Performed by: NURSE PRACTITIONER

## 2025-04-02 PROCEDURE — 99213 OFFICE O/P EST LOW 20 MIN: CPT | Performed by: NURSE PRACTITIONER

## 2025-04-02 PROCEDURE — 1159F MED LIST DOCD IN RCRD: CPT | Performed by: NURSE PRACTITIONER

## 2025-04-02 NOTE — PROGRESS NOTES
"Subjective     Tali Alvarez is a 69 y.o. female who presents for the following: multiple lesions.   Established patient in for per patient an age spot to her right side of face, \"something on the forehead\" that per patient she would like removed.    Patient states she also has a lesion on her right upper thigh that she would like removed.  Patient states that she also would like skin tags removed around her neck they were treated last year and per patient they came back.    Review of Systems:  No other skin or systemic complaints other than what is documented elsewhere in the note.    The following portions of the chart were reviewed this encounter and updated as appropriate:       Skin Cancer History  No skin cancer on file.    Specialty Problems          Dermatology Problems    Other seborrheic keratosis     Past Medical History:  Tali Alvarez  has a past medical history of COPD (chronic obstructive pulmonary disease) (Multi).    Past Surgical History:  Tali Alvarez  has a past surgical history that includes Other surgical history (07/01/2021) and Other surgical history (07/01/2021).    Family History:  Patient family history includes Heart attack in her brother, father, and mother.    Social History:  Tali Alvarez  reports that she has quit smoking. Her smoking use included cigarettes. She has never used smokeless tobacco. She reports that she does not currently use alcohol. She reports that she does not use drugs.    Allergies:  Doxycycline, Amoxicillin-pot clavulanate, Atorvastatin, Buspirone, Ciprofloxacin, Duloxetine, Sulfa (sulfonamide antibiotics), Azithromycin, and Rosuvastatin    Current Medications / CAM's:    Current Outpatient Medications:     apixaban (Eliquis) 5 mg tablet, Take 1 tablet (5 mg) by mouth 2 times a day., Disp: , Rfl:     ARIPiprazole (Abilify) 2 mg tablet, Take 1 tablet (2 mg) by mouth once daily., Disp: , Rfl:     azelastine (Astelin) 137 mcg (0.1 %) nasal spray, Administer into " affected nostril(s)., Disp: , Rfl:     b complex vitamins (Vitamins B Complex) capsule, Take by mouth., Disp: , Rfl:     budesonide-formoteroL (Symbicort) 80-4.5 mcg/actuation inhaler, Inhale 2 puffs 2 times a day. Rinse mouth with water after use to reduce aftertaste and incidence of candidiasis. Do not swallow., Disp: 10.2 g, Rfl: 0    cetirizine (ZyrTEC) 10 mg tablet, Take by mouth., Disp: , Rfl:     escitalopram (Lexapro) 10 mg tablet, Take by mouth. (Patient taking differently: Take 1 tablet (10 mg) by mouth once daily.), Disp: , Rfl:     ezetimibe (Zetia) 10 mg tablet, Take 1 tablet (10 mg) by mouth once daily., Disp: , Rfl:     levothyroxine (Synthroid) 88 mcg tablet, Take by mouth. (Patient taking differently: Take 1 tablet (88 mcg) by mouth early in the morning..), Disp: , Rfl:     tiotropium (Spiriva) 18 mcg inhalation capsule, Place 1 capsule (18 mcg) into inhaler and inhale once daily., Disp: , Rfl:      Objective   Well appearing patient in no apparent distress; mood and affect are within normal limits.      Assessment/Plan   1. Neoplasm of uncertain behavior of skin  Mid Forehead              Lesion biopsy  Type of biopsy: tangential    Informed consent: discussed and consent obtained    Timeout: patient name, date of birth, surgical site, and procedure verified    Procedure prep:  Patient was prepped and draped  Anesthesia: the lesion was anesthetized in a standard fashion    Anesthetic:  1% lidocaine w/ epinephrine 1-100,000 local infiltration  Instrument used: DermaBlade    Hemostasis achieved with: aluminum chloride    Outcome: patient tolerated procedure well    Post-procedure details: sterile dressing applied and wound care instructions given    Dressing type: petrolatum and bandage      Staff Communication: Dermatology Local Anesthesia: 1 % Lidocaine / Epinephrine - Amount: 1ml    Specimen 1 - Dermatopathology- DERM LAB  Differential Diagnosis: NMSC vs sebaceous hyperplasia vs other  Check  Margins Yes/No?:    Comments:    Dermpath Lab: Routine Histopathology (formalin-fixed tissue)    2. Seborrheic keratosis, inflamed (3)  Left Breast, Right Breast (2)  Stuck on, waxy macule(s)/papule(s)/plaque(s) with comedo-like openings and milia like cysts    -Discussed the nature of the diagnosis    -Patient desires treatment of the lesion(s) for cosmetic purposes  -Discussed treatment options, including cryotherapy  -Discussed the patient's lesion(s) of concern are benign appearing and do not have any concerning features on examination today.  -Patient is interested in cosmetic treatment options. Discussed that these lesions may be treated cosmetically for an out of pocket fee. Discussed that multiple treatments will be needed, treatment may result in scarring, hypo or hyperpigmentation, or residual erythema (the skin may become permanently lighter, darker or red/pink). Discussed that the patient will continue to develop more of these lesions over time, and the treated lesions may recur over time. The patient verbalizes understanding.     -After discussion, patient wishes to proceed with cosmetic cryotherapy    -Patient was warned of possible side effects of liquid nitrogen treatment including formation of blisters, crusting, tenderness, scar, and discoloration which may be permanent.   -Post treatment care discussed.  -Patient advised to return the office for re-evaluation if lesion(s) do not resolve within 4 weeks.   -Patient verbalizes understanding.    Cryosurgery: After risks of blistering, pain and scar were reviewed with the patient consent was obtained. A total of 8 lesion(s) were treated.  Liquid nitrogen was the cryotherapy agent.   Patient tolerated the procedure well.  Wound care instructions were reviewed with the patient.        3. Skin tag (3)  Chest - Medial (Center), Neck - Anterior (2)  Flesh colored pedunculated papule(s); there is no clinically evident irritation or inflammation    -Discussed  nature of the condition  -Reassurance  -Removal may be performed for an out of pocket fee given cosmetic nature of removal.  She states it was covered last time.    Destr of lesion - Chest - Medial (Center), Neck - Anterior (2)  Complexity: simple    Destruction method: cryotherapy    Lesion destroyed using liquid nitrogen: Yes    Cryotherapy cycles:  2

## 2025-04-04 LAB
LABORATORY COMMENT REPORT: NORMAL
PATH REPORT.FINAL DX SPEC: NORMAL
PATH REPORT.GROSS SPEC: NORMAL
PATH REPORT.MICROSCOPIC SPEC OTHER STN: NORMAL
PATH REPORT.RELEVANT HX SPEC: NORMAL
PATH REPORT.TOTAL CANCER: NORMAL

## 2025-04-17 ENCOUNTER — APPOINTMENT (OUTPATIENT)
Dept: ORTHOPEDIC SURGERY | Facility: CLINIC | Age: 70
End: 2025-04-17
Payer: MEDICARE

## 2025-04-23 ENCOUNTER — PATIENT MESSAGE (OUTPATIENT)
Dept: UROLOGY | Facility: HOSPITAL | Age: 70
End: 2025-04-23
Payer: MEDICARE

## 2025-05-05 ENCOUNTER — PRE-ADMISSION TESTING (OUTPATIENT)
Dept: PREADMISSION TESTING | Facility: HOSPITAL | Age: 70
End: 2025-05-05
Payer: MEDICARE

## 2025-05-06 ENCOUNTER — TELEPHONE (OUTPATIENT)
Dept: UROLOGY | Facility: CLINIC | Age: 70
End: 2025-05-06
Payer: MEDICARE

## 2025-05-06 NOTE — TELEPHONE ENCOUNTER
Patient called in to cancel surgery scheduled for 5/19 due to currently being sick. Patient advised to reach out to our office to schedule a follow up visit with  prior to being rescheduled for her procedure when she is ready. Patient in agreement with plan of care.

## 2025-05-07 ENCOUNTER — APPOINTMENT (OUTPATIENT)
Dept: VASCULAR SURGERY | Facility: CLINIC | Age: 70
End: 2025-05-07
Payer: MEDICARE

## 2025-06-03 ENCOUNTER — APPOINTMENT (OUTPATIENT)
Dept: UROLOGY | Facility: CLINIC | Age: 70
End: 2025-06-03
Payer: MEDICARE

## 2025-06-11 ENCOUNTER — APPOINTMENT (OUTPATIENT)
Facility: CLINIC | Age: 70
End: 2025-06-11
Payer: MEDICARE

## 2025-07-02 ENCOUNTER — APPOINTMENT (OUTPATIENT)
Dept: VASCULAR SURGERY | Facility: CLINIC | Age: 70
End: 2025-07-02
Payer: MEDICARE

## 2025-07-28 ENCOUNTER — APPOINTMENT (OUTPATIENT)
Facility: CLINIC | Age: 70
End: 2025-07-28
Payer: MEDICARE

## 2025-07-28 ENCOUNTER — APPOINTMENT (OUTPATIENT)
Dept: AUDIOLOGY | Facility: CLINIC | Age: 70
End: 2025-07-28
Payer: MEDICARE

## 2025-07-29 ENCOUNTER — APPOINTMENT (OUTPATIENT)
Dept: CARDIOLOGY | Facility: CLINIC | Age: 70
End: 2025-07-29
Payer: MEDICARE

## 2025-07-29 VITALS
DIASTOLIC BLOOD PRESSURE: 76 MMHG | HEART RATE: 66 BPM | OXYGEN SATURATION: 96 % | WEIGHT: 169 LBS | SYSTOLIC BLOOD PRESSURE: 130 MMHG | BODY MASS INDEX: 26.53 KG/M2 | HEIGHT: 67 IN

## 2025-07-29 DIAGNOSIS — R07.89 ATYPICAL CHEST PAIN: Primary | ICD-10-CM

## 2025-07-29 DIAGNOSIS — I25.10 CORONARY ARTERY CALCIFICATION: ICD-10-CM

## 2025-07-29 DIAGNOSIS — R00.2 PALPITATIONS: ICD-10-CM

## 2025-07-29 DIAGNOSIS — E78.5 DYSLIPIDEMIA: ICD-10-CM

## 2025-07-29 PROCEDURE — 99212 OFFICE O/P EST SF 10 MIN: CPT

## 2025-07-29 PROCEDURE — 99214 OFFICE O/P EST MOD 30 MIN: CPT | Performed by: INTERNAL MEDICINE

## 2025-07-29 PROCEDURE — 3008F BODY MASS INDEX DOCD: CPT | Performed by: INTERNAL MEDICINE

## 2025-07-29 PROCEDURE — 1159F MED LIST DOCD IN RCRD: CPT | Performed by: INTERNAL MEDICINE

## 2025-07-29 PROCEDURE — 1160F RVW MEDS BY RX/DR IN RCRD: CPT | Performed by: INTERNAL MEDICINE

## 2025-07-29 RX ORDER — ALBUTEROL SULFATE 90 UG/1
2 INHALANT RESPIRATORY (INHALATION) EVERY 4 HOURS PRN
COMMUNITY
Start: 2025-05-22

## 2025-07-29 RX ORDER — VIT C/E/ZN/COPPR/LUTEIN/ZEAXAN 250MG-90MG
25 CAPSULE ORAL DAILY
COMMUNITY
End: 2025-07-29 | Stop reason: WASHOUT

## 2025-07-29 NOTE — PROGRESS NOTES
Chief Complaint:   No chief complaint on file.     History Of Present Illness:    Tali Alvarez is a 70 y.o. female with a history of coronary artery calcification, dyslipidemia, palpitations, superficial right lower extremity thrombus on DOAC, CKD, and hypothyroidism here for establishment of cardiovascular care.  The patient has typically seen Tracy Schwab.    Still has intermittent episodes of discomfort in the chest.  Oftentimes they are sharp and unrelated to activity.  She also can experience chest discomfort if she is try to take a deep breath.    She admittedly is an anxious person and believes that some of this could be from anxiety.  She has had a GI workup and was told there is nothing wrong from a gastrointestinal standpoint.    CT calcium score 11/14/2024: Total score 146  LM 0  LAD 83  LCx 8  RCA 55    Nuclear stress test 10/16/2024: No evidence of ischemia or scar.    Echocardiogram 7/20/2023: EF 55 to 60%.  Grade I diastolic dysfunction.  Mild TR.    2-week ambulatory monitor August 2023: Predominantly sinus rhythm.  Rare PACs and PVCs.    CT calcium score 8/2019: Total score 74  LM 0  LAD 14  LCx 0  RCA 60     Past Medical History:  She has a past medical history of Anxiety, Cholelithiasis, Chronic kidney disease, COPD (chronic obstructive pulmonary disease) (Multi), Depression, Hyperlipidemia, Hypothyroidism, Lung nodule, Osteopenia, and Urinary tract infection.    Past Surgical History:  She has a past surgical history that includes Other surgical history (07/01/2021); Other surgical history (07/01/2021); Tubal ligation; and Breast biopsy.      Social History:  She reports that she has quit smoking. Her smoking use included cigarettes. She has never used smokeless tobacco. She reports that she does not currently use alcohol. She reports that she does not use drugs.    Family History:  Family History[1]     Allergies:  Doxycycline, Amoxicillin-pot clavulanate, Atorvastatin, Buspirone, Ciprofloxacin,  "Duloxetine, Sulfa (sulfonamide antibiotics), Azithromycin, and Rosuvastatin    Outpatient Medications:  Current Outpatient Medications   Medication Instructions    albuterol 90 mcg/actuation inhaler 2 puffs, Every 4 hours PRN    azelastine (Astelin) 137 mcg (0.1 %) nasal spray Administer into affected nostril(s).    b complex vitamins (Vitamins B Complex) capsule Take by mouth.    budesonide-formoteroL (Symbicort) 80-4.5 mcg/actuation inhaler 2 puffs, inhalation, 2 times daily RT, Rinse mouth with water after use to reduce aftertaste and incidence of candidiasis. Do not swallow.    cetirizine (ZyrTEC) 10 mg tablet Take by mouth.    escitalopram (LEXAPRO) 20 mg, Daily    ezetimibe (ZETIA) 10 mg, Daily    levothyroxine (Synthroid) 88 mcg tablet Take by mouth.       Last Recorded Vitals:  Visit Vitals  /76 (BP Location: Left arm, Patient Position: Sitting)   Pulse 66   Ht 1.702 m (5' 7\")   Wt 76.7 kg (169 lb)   SpO2 96%   BMI 26.47 kg/m²   OB Status Postmenopausal   Smoking Status Former   BSA 1.9 m²      LASTWT(3):   Wt Readings from Last 3 Encounters:   07/29/25 76.7 kg (169 lb)   02/21/25 76.7 kg (169 lb)   10/10/24 75 kg (165 lb 6.4 oz)       Physical Exam:  In general: alert and in no acute distress.   HEENT: Carotid upstrokes normal with no bruits. JVP is normal.   Pulmonary: Clear to auscultation bilaterally.  Cardiovascular: S1, S2, regular. No appreciable murmurs, rubs or gallops.   Lower extremities: Warm. 2+ distal pulses. No edema.       Last Labs:  CBC -  Recent Labs     09/17/24  0918 03/09/24  1549 01/22/24  1126   WBC 5.6 7.4 5.9   HGB 14.3 15.1 14.5   HCT 44.4 46.7* 46.0    376 365   MCV 92 91 94       CMP -  Recent Labs     09/17/24  0918 03/09/24  1549 01/22/24  1126    140 144   K 4.2 4.3 4.9    103 104   CO2 31 29 34*   ANIONGAP 11 12 11   BUN 15 21 19   CREATININE 1.03 1.02 1.14*   EGFR 60* 61 53*     Recent Labs     09/17/24  0918 03/09/24  1549 01/22/24  1126   ALBUMIN " 4.3 4.6 4.2   ALKPHOS 55 59 52   ALT 15 14 11   AST 13 14 12   BILITOT 0.5 0.5 0.5       LIPID PANEL -   Recent Labs     11/08/24  0904 01/22/24  1126   CHOL 196 292*   LDLCALC 129* 206*   HDL 43.4 40.7   TRIG 120 227*       Recent Labs     09/17/24  0918 03/09/24  1549 07/17/23  1541   BNP 23 24  --    HGBA1C  --   --  5.3           Assessment/Plan   1) chest pain: Presumed to be noncardiac as the cardiac workup has been unrevealing for cardiac source of symptoms.  Have reassured her at this time.  No additional workup is needed.    2) coronary artery calcification: Mild coronary artery calcification by CT scan.  Nuclear stress test October 2024 with no evidence of ischemia.    3) dyslipidemia: Intolerant to several statins due to myalgias.  Tolerating Zetia however LDL remains greater than 100.  Fortunately her LDL has dropped nearly 50% from January 2024 (206) to November 2024 (129).    As long as her repeat lipid panel is roughly the same then I would not recommend any changes.  If the LDL has risen then I would consider the use of Nexletol.  If she were able to tolerate Nexletol we can always consider changing her to Nexletol/ezetimibe combination    4) palpitations: rare.  Continue to observe    5) follow-up: Will schedule 6-month follow-up with Tracy Schwab John E Coletta, MD         [1]   Family History  Problem Relation Name Age of Onset    Heart attack Mother      Heart attack Father Bandar Alspanghia     Kidney disease Father Bandar Alspa     Heart attack Brother      Heart disease Mother's Sister Aunt Gretel Mccormack, Aunt Ryanne

## 2025-08-28 ENCOUNTER — APPOINTMENT (OUTPATIENT)
Dept: CARDIOLOGY | Facility: CLINIC | Age: 70
End: 2025-08-28
Payer: MEDICARE

## 2026-01-29 ENCOUNTER — APPOINTMENT (OUTPATIENT)
Dept: CARDIOLOGY | Facility: CLINIC | Age: 71
End: 2026-01-29
Payer: MEDICARE